# Patient Record
Sex: MALE | Race: WHITE | NOT HISPANIC OR LATINO | Employment: OTHER | URBAN - METROPOLITAN AREA
[De-identification: names, ages, dates, MRNs, and addresses within clinical notes are randomized per-mention and may not be internally consistent; named-entity substitution may affect disease eponyms.]

---

## 2017-01-01 ENCOUNTER — OFFICE VISIT (OUTPATIENT)
Dept: CARDIOLOGY | Facility: CLINIC | Age: 82
End: 2017-01-01

## 2017-01-01 ENCOUNTER — CLINICAL SUPPORT NO REQUIREMENTS (OUTPATIENT)
Dept: CARDIOLOGY | Facility: CLINIC | Age: 82
End: 2017-01-01

## 2017-01-01 VITALS
SYSTOLIC BLOOD PRESSURE: 122 MMHG | WEIGHT: 165 LBS | DIASTOLIC BLOOD PRESSURE: 82 MMHG | BODY MASS INDEX: 25.01 KG/M2 | HEART RATE: 61 BPM | HEIGHT: 68 IN

## 2017-01-01 DIAGNOSIS — I10 ESSENTIAL HYPERTENSION: ICD-10-CM

## 2017-01-01 DIAGNOSIS — I74.9 ARTERIAL EMBOLISM AND THROMBOSIS (HCC): Primary | ICD-10-CM

## 2017-01-01 DIAGNOSIS — Z95.0 PACEMAKER: ICD-10-CM

## 2017-01-01 DIAGNOSIS — I48.20 CHRONIC ATRIAL FIBRILLATION (HCC): Primary | ICD-10-CM

## 2017-01-01 DIAGNOSIS — E78.5 HYPERLIPIDEMIA, UNSPECIFIED HYPERLIPIDEMIA TYPE: ICD-10-CM

## 2017-01-01 DIAGNOSIS — I48.20 CHRONIC ATRIAL FIBRILLATION (HCC): ICD-10-CM

## 2017-01-01 PROCEDURE — 93279 PRGRMG DEV EVAL PM/LDLS PM: CPT | Performed by: INTERNAL MEDICINE

## 2017-01-01 PROCEDURE — 93000 ELECTROCARDIOGRAM COMPLETE: CPT | Performed by: INTERNAL MEDICINE

## 2017-01-01 PROCEDURE — 99214 OFFICE O/P EST MOD 30 MIN: CPT | Performed by: INTERNAL MEDICINE

## 2017-01-01 RX ORDER — AMLODIPINE BESYLATE 5 MG/1
5 TABLET ORAL DAILY
Qty: 90 TABLET | Refills: 1 | Status: SHIPPED | OUTPATIENT
Start: 2017-01-01

## 2017-01-01 RX ORDER — AMLODIPINE BESYLATE 2.5 MG/1
2.5 TABLET ORAL DAILY
Qty: 90 TABLET | Refills: 1 | Status: SHIPPED | OUTPATIENT
Start: 2017-01-01 | End: 2017-01-01 | Stop reason: SDUPTHER

## 2017-02-14 RX ORDER — AMLODIPINE BESYLATE 5 MG/1
5 TABLET ORAL DAILY
Qty: 90 TABLET | Refills: 1 | Status: SHIPPED | OUTPATIENT
Start: 2017-02-14 | End: 2017-01-01 | Stop reason: SDUPTHER

## 2017-02-14 RX ORDER — WARFARIN SODIUM 4 MG
TABLET ORAL
Qty: 90 TABLET | Refills: 0 | Status: SHIPPED | OUTPATIENT
Start: 2017-02-14 | End: 2017-04-03 | Stop reason: SDUPTHER

## 2017-04-03 RX ORDER — WARFARIN SODIUM 4 MG
TABLET ORAL
Qty: 90 TABLET | Refills: 0 | Status: SHIPPED | OUTPATIENT
Start: 2017-04-03 | End: 2017-07-26 | Stop reason: SDUPTHER

## 2017-07-26 RX ORDER — WARFARIN SODIUM 4 MG
TABLET ORAL
Qty: 90 TABLET | Refills: 1 | Status: SHIPPED | OUTPATIENT
Start: 2017-07-26 | End: 2018-01-01 | Stop reason: SDUPTHER

## 2017-10-30 NOTE — PROGRESS NOTES
Elder Parker  4/11/1923  Date of Office Visit: 10/30/17  Encounter Provider: Dewayne Mccray MD  Place of Service: [facility] CARDIOLOGY    CHIEF COMPLAINT:   1. Followup for permanent atrial fibrillation.   2. Dual chamber pacemaker placement.   3. 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     HISTORY OF PRESENT ILLNESS:   Dr. Pallares,   I had the pleasure of seeing your patient in followup today. As you well know, he is a very pleasant 89-year-old gentleman with a medical history of chronic atrial fibrillation, hypertension, hyperlipidemia who presented to the hospital with a pulseless pallor in his right upper extremity in 2014. During his presentation he was noted to have an INR of 1.4. He was found to have a right upper extremity thrombotic arterial occlusion and underwent open brachial embolectomy which was successful. He was continued on a heparin drip and was subsequently discharged with therapeutic INRs.      Since our last visit, he has been doing very well.  He denies any recent falls.  No bleeding complications.  He is tolerating his current medical regimen well.  He has no real change from our prior visit.        Review of Systems   Constitution: Negative for fever, weakness and malaise/fatigue.   HENT: Negative for nosebleeds and sore throat.    Eyes: Positive for vision loss in left eye and vision loss in right eye. Negative for blurred vision and double vision.   Cardiovascular: Positive for leg swelling. Negative for chest pain, claudication, palpitations and syncope.   Respiratory: Negative for cough, shortness of breath and snoring.    Endocrine: Negative for cold intolerance, heat intolerance and polydipsia.   Skin: Positive for color change. Negative for itching and poor wound healing.   Musculoskeletal: Negative for joint pain, joint swelling, muscle weakness and myalgias.   Gastrointestinal:  "Negative for abdominal pain, melena, nausea and vomiting.   Neurological: Negative for light-headedness, loss of balance, seizures and vertigo.   Psychiatric/Behavioral: Negative for altered mental status and depression.          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 (Patient taking differently: Take 2.5 mg by mouth Daily.) 90 tablet 1   • atorvastatin (LIPITOR) 20 MG tablet Take 1 tablet by mouth daily.     • COUMADIN 4 MG tablet TAKE ONE TABLET BY MOUTH DAILY OR AS DIRECTED. 90 tablet 1   • irbesartan (AVAPRO) 300 MG tablet Take 1 tablet by mouth daily.     • ISOSORBIDE DINITRATE PO Take 90 mg by mouth Daily.     • nitroglycerin (NITROSTAT) 0.4 MG SL tablet Place 0.4 mg under the tongue every 5 (five) minutes as needed for chest pain. Take no more than 3 doses in 15 minutes.       No current facility-administered medications on file prior to visit.        No Known Allergies    Vitals:    10/30/17 1154   BP: 122/82   Pulse: 61   Weight: 165 lb (74.8 kg)   Height: 68\" (172.7 cm)     Physical Exam   Constitutional: He is oriented to person, place, and time. He appears well-developed and well-nourished.   HENT:   Head: Normocephalic and atraumatic.   Eyes: Conjunctivae and EOM are normal. No scleral icterus.   Neck: Normal range of motion. Neck supple. Normal carotid pulses, no hepatojugular reflux and no JVD " present. Carotid bruit is not present. No tracheal deviation present. No thyromegaly present.   Cardiovascular: Normal rate and regular rhythm.  Exam reveals no gallop and no friction rub.    No murmur heard.  Pulses:       Carotid pulses are 2+ on the right side, and 2+ on the left side.       Radial pulses are 2+ on the right side, and 2+ on the left side.        Femoral pulses are 2+ on the right side, and 2+ on the left side.       Dorsalis pedis pulses are 2+ on the right side, and 2+ on the left side.        Posterior tibial pulses are 2+ on the right side, and 2+ on the left side.   Pulmonary/Chest: Breath sounds normal. No respiratory distress. He has no decreased breath sounds. He has no wheezes. He has no rhonchi. He has no rales. He exhibits no tenderness.   Abdominal: Soft. Bowel sounds are normal. He exhibits no distension. There is no tenderness. There is no rebound.   Musculoskeletal: He exhibits edema. He exhibits no deformity. Tenderness: 1+ bilateral LE edema.   Neurological: He is alert and oriented to person, place, and time. He has normal strength. No sensory deficit.   Skin: No rash noted. No erythema.   Pacemaker left chest wall.  Venous stasis changes bilateral LE.        Psychiatric: He has a normal mood and affect. His behavior is normal.       No components found for: CBC  No results found for: CMP  No components found for: LIPID  No results found for: BMP      ECG 12 Lead  Date/Time: 10/30/2017 12:30 PM  Performed by: LUIS MANUEL HOLLINS  Authorized by: LUIS MANUEL HOLLINS   Comparison: compared with previous ECG from 11/28/2017  Similar to previous ECG  Rhythm: atrial fibrillation  Comments: Ventricular paced               DISCUSSION/SUMMARY 89-year-old gentleman with a medical history of chronic atrial fibrillation, dual chamber pacemaker, and previous right upper extremity arterial embolism in acute limb with embolectomy who presents back for followup.  Since our last visit he has  been doing very well.   Denies any new symptoms.  No recent bleeding complications.    1. Chronic atrial fibrillation; prior dual chamber pacemaker implantation.  Continue coumadin therapy lifelong.  He has not had any recent falls or blood loss in his stool.    2. Essential hypertension; blood pressure is well-controlled today.  3.  Hyperlipidemia: Statin therapy monitored by primary    I plan on seeing him back in 1 year.     Atrial Fibrillation and Atrial Flutter  Assessment  • The patient has persistent atrial fibrillation  • This is non-valvular in etiology  • The patient's CHADS2-VASc score is 6  • A [id number] score of 2 or more is considered a high risk for a thromboembolic event  • Warfarin prescribed    Plan  • Continue in atrial fibrillation with rate control  • Continue warfarin for antithrombotic therapy, bleeding issues discussed    Subjective - Objective  • The average duration of atrial fibrillation episodes is >3 months

## 2018-01-01 ENCOUNTER — HOSPITAL ENCOUNTER (OUTPATIENT)
Dept: CARDIOLOGY | Facility: HOSPITAL | Age: 83
Discharge: HOME | End: 2018-05-14
Admitting: NURSE PRACTITIONER

## 2018-01-01 ENCOUNTER — LAB (OUTPATIENT)
Dept: OTHER | Facility: HOSPITAL | Age: 83
End: 2018-01-01

## 2018-01-01 ENCOUNTER — HOSPITAL ENCOUNTER (OUTPATIENT)
Dept: PET IMAGING | Facility: HOSPITAL | Age: 83
Discharge: HOME | End: 2018-06-20
Attending: INTERNAL MEDICINE

## 2018-01-01 ENCOUNTER — APPOINTMENT (OUTPATIENT)
Dept: LAB | Facility: HOSPITAL | Age: 83
End: 2018-01-01

## 2018-01-01 ENCOUNTER — TELEPHONE (OUTPATIENT)
Dept: CARDIOLOGY | Facility: CLINIC | Age: 83
End: 2018-01-01

## 2018-01-01 ENCOUNTER — DOCUMENTATION (OUTPATIENT)
Dept: ONCOLOGY | Facility: CLINIC | Age: 83
End: 2018-01-01

## 2018-01-01 ENCOUNTER — CLINICAL SUPPORT NO REQUIREMENTS (OUTPATIENT)
Dept: CARDIOLOGY | Facility: CLINIC | Age: 83
End: 2018-01-01

## 2018-01-01 ENCOUNTER — APPOINTMENT (OUTPATIENT)
Dept: OTHER | Facility: HOSPITAL | Age: 83
End: 2018-01-01

## 2018-01-01 ENCOUNTER — TRANSCRIBE ORDERS (OUTPATIENT)
Dept: ADMINISTRATIVE | Facility: HOSPITAL | Age: 83
End: 2018-01-01

## 2018-01-01 ENCOUNTER — TELEPHONE (OUTPATIENT)
Dept: ONCOLOGY | Facility: CLINIC | Age: 83
End: 2018-01-01

## 2018-01-01 ENCOUNTER — HOSPITAL ENCOUNTER (OUTPATIENT)
Dept: ULTRASOUND IMAGING | Facility: HOSPITAL | Age: 83
Discharge: HOME | End: 2018-04-30
Attending: INTERNAL MEDICINE

## 2018-01-01 ENCOUNTER — APPOINTMENT (OUTPATIENT)
Dept: CT IMAGING | Facility: HOSPITAL | Age: 83
End: 2018-01-01

## 2018-01-01 ENCOUNTER — OFFICE VISIT (OUTPATIENT)
Dept: ONCOLOGY | Facility: CLINIC | Age: 83
End: 2018-01-01

## 2018-01-01 ENCOUNTER — APPOINTMENT (OUTPATIENT)
Dept: GENERAL RADIOLOGY | Facility: HOSPITAL | Age: 83
End: 2018-01-01

## 2018-01-01 ENCOUNTER — HOSPITAL ENCOUNTER (OUTPATIENT)
Dept: CARDIOLOGY | Facility: HOSPITAL | Age: 83
Discharge: HOME | End: 2018-04-30
Admitting: INTERNAL MEDICINE

## 2018-01-01 ENCOUNTER — TELEPHONE (OUTPATIENT)
Dept: CARDIOLOGY | Facility: HOSPITAL | Age: 83
End: 2018-01-01

## 2018-01-01 ENCOUNTER — TELEPHONE (OUTPATIENT)
Dept: INTERVENTIONAL RADIOLOGY/VASCULAR | Facility: HOSPITAL | Age: 83
End: 2018-01-01

## 2018-01-01 ENCOUNTER — OFFICE VISIT (OUTPATIENT)
Dept: CARDIOLOGY | Facility: CLINIC | Age: 83
End: 2018-01-01

## 2018-01-01 ENCOUNTER — CONSULT (OUTPATIENT)
Dept: ONCOLOGY | Facility: CLINIC | Age: 83
End: 2018-01-01

## 2018-01-01 ENCOUNTER — APPOINTMENT (OUTPATIENT)
Dept: ONCOLOGY | Facility: CLINIC | Age: 83
End: 2018-01-01

## 2018-01-01 ENCOUNTER — HOSPITAL ENCOUNTER (EMERGENCY)
Facility: HOSPITAL | Age: 83
Discharge: HOME | End: 2018-05-19
Attending: EMERGENCY MEDICINE | Admitting: EMERGENCY MEDICINE

## 2018-01-01 ENCOUNTER — HOSPITAL ENCOUNTER (OUTPATIENT)
Dept: PET IMAGING | Facility: HOSPITAL | Age: 83
Discharge: HOME | End: 2018-06-20
Attending: INTERNAL MEDICINE | Admitting: INTERNAL MEDICINE

## 2018-01-01 ENCOUNTER — HOSPITAL ENCOUNTER (OUTPATIENT)
Dept: CT IMAGING | Facility: HOSPITAL | Age: 83
Discharge: HOME | End: 2018-06-06
Attending: INTERNAL MEDICINE | Admitting: INTERNAL MEDICINE

## 2018-01-01 ENCOUNTER — TRANSCRIBE ORDERS (OUTPATIENT)
Dept: CARDIOLOGY | Facility: CLINIC | Age: 83
End: 2018-01-01

## 2018-01-01 ENCOUNTER — TELEPHONE (OUTPATIENT)
Dept: ONCOLOGY | Facility: HOSPITAL | Age: 83
End: 2018-01-01

## 2018-01-01 VITALS
RESPIRATION RATE: 17 BRPM | WEIGHT: 170 LBS | OXYGEN SATURATION: 93 % | HEART RATE: 76 BPM | BODY MASS INDEX: 25.18 KG/M2 | HEIGHT: 69 IN | SYSTOLIC BLOOD PRESSURE: 125 MMHG | DIASTOLIC BLOOD PRESSURE: 67 MMHG

## 2018-01-01 VITALS
HEIGHT: 69 IN | SYSTOLIC BLOOD PRESSURE: 160 MMHG | DIASTOLIC BLOOD PRESSURE: 91 MMHG | RESPIRATION RATE: 14 BRPM | TEMPERATURE: 97.4 F | OXYGEN SATURATION: 94 % | WEIGHT: 160 LBS | BODY MASS INDEX: 23.7 KG/M2 | HEART RATE: 61 BPM

## 2018-01-01 VITALS
DIASTOLIC BLOOD PRESSURE: 65 MMHG | HEIGHT: 68 IN | SYSTOLIC BLOOD PRESSURE: 128 MMHG | RESPIRATION RATE: 16 BRPM | HEART RATE: 80 BPM | BODY MASS INDEX: 25.39 KG/M2 | WEIGHT: 167.5 LBS | OXYGEN SATURATION: 93 % | TEMPERATURE: 98.9 F

## 2018-01-01 VITALS
OXYGEN SATURATION: 98 % | WEIGHT: 160 LBS | TEMPERATURE: 98 F | SYSTOLIC BLOOD PRESSURE: 139 MMHG | HEART RATE: 59 BPM | DIASTOLIC BLOOD PRESSURE: 65 MMHG | HEIGHT: 68 IN | BODY MASS INDEX: 24.25 KG/M2 | RESPIRATION RATE: 16 BRPM

## 2018-01-01 VITALS
WEIGHT: 167.4 LBS | SYSTOLIC BLOOD PRESSURE: 123 MMHG | HEIGHT: 68 IN | RESPIRATION RATE: 14 BRPM | OXYGEN SATURATION: 95 % | TEMPERATURE: 98.5 F | DIASTOLIC BLOOD PRESSURE: 63 MMHG | BODY MASS INDEX: 25.37 KG/M2 | HEART RATE: 62 BPM

## 2018-01-01 VITALS
HEART RATE: 74 BPM | OXYGEN SATURATION: 91 % | DIASTOLIC BLOOD PRESSURE: 70 MMHG | WEIGHT: 167.1 LBS | SYSTOLIC BLOOD PRESSURE: 149 MMHG | BODY MASS INDEX: 25.33 KG/M2 | TEMPERATURE: 98.4 F | RESPIRATION RATE: 16 BRPM | HEIGHT: 68 IN

## 2018-01-01 VITALS
BODY MASS INDEX: 24.29 KG/M2 | DIASTOLIC BLOOD PRESSURE: 58 MMHG | HEIGHT: 69 IN | SYSTOLIC BLOOD PRESSURE: 110 MMHG | HEART RATE: 69 BPM | WEIGHT: 164 LBS

## 2018-01-01 DIAGNOSIS — I74.9 ARTERIAL EMBOLISM AND THROMBOSIS (HCC): ICD-10-CM

## 2018-01-01 DIAGNOSIS — I48.20 CHRONIC ATRIAL FIBRILLATION (HCC): Primary | ICD-10-CM

## 2018-01-01 DIAGNOSIS — I48.20 CHRONIC ATRIAL FIBRILLATION (HCC): ICD-10-CM

## 2018-01-01 DIAGNOSIS — M25.511 ACUTE PAIN OF RIGHT SHOULDER: ICD-10-CM

## 2018-01-01 DIAGNOSIS — C34.91 NON-SMALL CELL CANCER OF RIGHT LUNG (HCC): Primary | ICD-10-CM

## 2018-01-01 DIAGNOSIS — R91.8 MASS OF UPPER LOBE OF RIGHT LUNG: ICD-10-CM

## 2018-01-01 DIAGNOSIS — R60.0 LEG EDEMA, LEFT: ICD-10-CM

## 2018-01-01 DIAGNOSIS — R01.1 HEART MURMUR: Primary | ICD-10-CM

## 2018-01-01 DIAGNOSIS — I74.9 ARTERIAL EMBOLISM AND THROMBOSIS (HCC): Primary | ICD-10-CM

## 2018-01-01 DIAGNOSIS — E78.5 HYPERLIPIDEMIA, UNSPECIFIED HYPERLIPIDEMIA TYPE: ICD-10-CM

## 2018-01-01 DIAGNOSIS — I50.33 ACUTE ON CHRONIC DIASTOLIC CONGESTIVE HEART FAILURE (HCC): ICD-10-CM

## 2018-01-01 DIAGNOSIS — C34.91 NON-SMALL CELL CANCER OF RIGHT LUNG (HCC): ICD-10-CM

## 2018-01-01 DIAGNOSIS — Z79.01 ANTICOAGULATED ON COUMADIN: ICD-10-CM

## 2018-01-01 DIAGNOSIS — I10 ESSENTIAL HYPERTENSION: ICD-10-CM

## 2018-01-01 DIAGNOSIS — R91.8 MASS OF UPPER LOBE OF RIGHT LUNG: Primary | ICD-10-CM

## 2018-01-01 DIAGNOSIS — R01.1 HEART MURMUR: ICD-10-CM

## 2018-01-01 DIAGNOSIS — N17.9 AKI (ACUTE KIDNEY INJURY) (HCC): Primary | ICD-10-CM

## 2018-01-01 DIAGNOSIS — R06.02 SHORTNESS OF BREATH: Primary | ICD-10-CM

## 2018-01-01 DIAGNOSIS — I50.32 CHRONIC DIASTOLIC CONGESTIVE HEART FAILURE (HCC): ICD-10-CM

## 2018-01-01 DIAGNOSIS — R63.5 WEIGHT GAIN: Primary | ICD-10-CM

## 2018-01-01 LAB
ALBUMIN SERPL-MCNC: 2.8 G/DL (ref 3.5–5.2)
ALBUMIN SERPL-MCNC: 3.3 G/DL (ref 3.5–5.2)
ALBUMIN/GLOB SERPL: 1 G/DL
ALBUMIN/GLOB SERPL: 1.1 G/DL
ALP SERPL-CCNC: 104 U/L (ref 39–117)
ALP SERPL-CCNC: 99 U/L (ref 39–117)
ALT SERPL W P-5'-P-CCNC: 10 U/L (ref 1–41)
ALT SERPL W P-5'-P-CCNC: 9 U/L (ref 1–41)
ANION GAP SERPL CALCULATED.3IONS-SCNC: 11.1 MMOL/L
ANION GAP SERPL CALCULATED.3IONS-SCNC: 13.1 MMOL/L
ANION GAP SERPL CALCULATED.3IONS-SCNC: 9.3 MMOL/L
ASCENDING AORTA: 3.3 CM
AST SERPL-CCNC: 11 U/L (ref 1–40)
AST SERPL-CCNC: 8 U/L (ref 1–40)
BASOPHILS # BLD AUTO: 0.03 10*3/MM3 (ref 0–0.2)
BASOPHILS # BLD AUTO: 0.04 10*3/MM3 (ref 0–0.2)
BASOPHILS # BLD AUTO: 0.04 10*3/MM3 (ref 0–0.2)
BASOPHILS # BLD AUTO: 0.06 10*3/MM3 (ref 0–0.2)
BASOPHILS # BLD AUTO: 0.06 10*3/MM3 (ref 0–0.2)
BASOPHILS NFR BLD AUTO: 0.6 % (ref 0–1.5)
BASOPHILS NFR BLD AUTO: 0.6 % (ref 0–1.5)
BASOPHILS NFR BLD AUTO: 0.7 % (ref 0–1.5)
BASOPHILS NFR BLD AUTO: 0.9 % (ref 0–1.5)
BASOPHILS NFR BLD AUTO: 1.1 % (ref 0–1.5)
BH CV ECHO MEAS - ACS: 2.2 CM
BH CV ECHO MEAS - AO MAX PG (FULL): 5.3 MMHG
BH CV ECHO MEAS - AO MAX PG: 8.8 MMHG
BH CV ECHO MEAS - AO MEAN PG (FULL): 2.8 MMHG
BH CV ECHO MEAS - AO MEAN PG: 4.7 MMHG
BH CV ECHO MEAS - AO ROOT AREA (BSA CORRECTED): 1.9
BH CV ECHO MEAS - AO ROOT AREA: 10.7 CM^2
BH CV ECHO MEAS - AO ROOT DIAM: 3.7 CM
BH CV ECHO MEAS - AO V2 MAX: 148.7 CM/SEC
BH CV ECHO MEAS - AO V2 MEAN: 99.7 CM/SEC
BH CV ECHO MEAS - AO V2 VTI: 27.7 CM
BH CV ECHO MEAS - AVA(I,A): 2.1 CM^2
BH CV ECHO MEAS - AVA(I,D): 2.1 CM^2
BH CV ECHO MEAS - AVA(V,A): 2.3 CM^2
BH CV ECHO MEAS - AVA(V,D): 2.3 CM^2
BH CV ECHO MEAS - BSA(HAYCOCK): 1.9 M^2
BH CV ECHO MEAS - BSA: 1.9 M^2
BH CV ECHO MEAS - BZI_BMI: 25.1 KILOGRAMS/M^2
BH CV ECHO MEAS - BZI_METRIC_HEIGHT: 175.3 CM
BH CV ECHO MEAS - BZI_METRIC_WEIGHT: 77.1 KG
BH CV ECHO MEAS - CONTRAST EF (2CH): 64.2 ML/M^2
BH CV ECHO MEAS - CONTRAST EF 4CH: 56.1 ML/M^2
BH CV ECHO MEAS - EDV(MOD-SP2): 67 ML
BH CV ECHO MEAS - EDV(MOD-SP4): 66 ML
BH CV ECHO MEAS - EDV(TEICH): 110.9 ML
BH CV ECHO MEAS - EF(CUBED): 76.6 %
BH CV ECHO MEAS - EF(MOD-BP): 59 %
BH CV ECHO MEAS - EF(MOD-SP2): 64.2 %
BH CV ECHO MEAS - EF(MOD-SP4): 56.1 %
BH CV ECHO MEAS - EF(TEICH): 68.5 %
BH CV ECHO MEAS - ESV(MOD-SP2): 24 ML
BH CV ECHO MEAS - ESV(MOD-SP4): 29 ML
BH CV ECHO MEAS - ESV(TEICH): 34.9 ML
BH CV ECHO MEAS - FS: 38.4 %
BH CV ECHO MEAS - IVS/LVPW: 1
BH CV ECHO MEAS - IVSD: 1.1 CM
BH CV ECHO MEAS - LAT PEAK E' VEL: 7 CM/SEC
BH CV ECHO MEAS - LV DIASTOLIC VOL/BSA (35-75): 34.2 ML/M^2
BH CV ECHO MEAS - LV MASS(C)D: 194.7 GRAMS
BH CV ECHO MEAS - LV MASS(C)DI: 101 GRAMS/M^2
BH CV ECHO MEAS - LV MAX PG: 3.6 MMHG
BH CV ECHO MEAS - LV MEAN PG: 1.9 MMHG
BH CV ECHO MEAS - LV SYSTOLIC VOL/BSA (12-30): 15 ML/M^2
BH CV ECHO MEAS - LV V1 MAX: 94.3 CM/SEC
BH CV ECHO MEAS - LV V1 MEAN: 64.2 CM/SEC
BH CV ECHO MEAS - LV V1 VTI: 16.3 CM
BH CV ECHO MEAS - LVIDD: 4.9 CM
BH CV ECHO MEAS - LVIDS: 3 CM
BH CV ECHO MEAS - LVLD AP2: 6.2 CM
BH CV ECHO MEAS - LVLD AP4: 6.8 CM
BH CV ECHO MEAS - LVLS AP2: 5.3 CM
BH CV ECHO MEAS - LVLS AP4: 6.1 CM
BH CV ECHO MEAS - LVOT AREA (M): 3.5 CM^2
BH CV ECHO MEAS - LVOT AREA: 3.6 CM^2
BH CV ECHO MEAS - LVOT DIAM: 2.1 CM
BH CV ECHO MEAS - LVPWD: 1.1 CM
BH CV ECHO MEAS - MED PEAK E' VEL: 9 CM/SEC
BH CV ECHO MEAS - MR MAX PG: 28.1 MMHG
BH CV ECHO MEAS - MR MAX VEL: 265 CM/SEC
BH CV ECHO MEAS - MV A DUR: 0.11 SEC
BH CV ECHO MEAS - MV A MAX VEL: 55.7 CM/SEC
BH CV ECHO MEAS - MV DEC SLOPE: 645.9 CM/SEC^2
BH CV ECHO MEAS - MV DEC TIME: 0.15 SEC
BH CV ECHO MEAS - MV E MAX VEL: 101.4 CM/SEC
BH CV ECHO MEAS - MV E/A: 1.8
BH CV ECHO MEAS - MV MAX PG: 3.5 MMHG
BH CV ECHO MEAS - MV MEAN PG: 1 MMHG
BH CV ECHO MEAS - MV P1/2T MAX VEL: 102.6 CM/SEC
BH CV ECHO MEAS - MV P1/2T: 46.5 MSEC
BH CV ECHO MEAS - MV V2 MAX: 92.9 CM/SEC
BH CV ECHO MEAS - MV V2 MEAN: 44.4 CM/SEC
BH CV ECHO MEAS - MV V2 VTI: 22.4 CM
BH CV ECHO MEAS - MVA P1/2T LCG: 2.1 CM^2
BH CV ECHO MEAS - MVA(P1/2T): 4.7 CM^2
BH CV ECHO MEAS - MVA(VTI): 2.6 CM^2
BH CV ECHO MEAS - PA ACC TIME: 0.08 SEC
BH CV ECHO MEAS - PA MAX PG (FULL): 0.82 MMHG
BH CV ECHO MEAS - PA MAX PG: 2.6 MMHG
BH CV ECHO MEAS - PA PR(ACCEL): 41 MMHG
BH CV ECHO MEAS - PA V2 MAX: 80.1 CM/SEC
BH CV ECHO MEAS - PULM DIAS VEL: 19.9 CM/SEC
BH CV ECHO MEAS - PULM S/D: 1.9
BH CV ECHO MEAS - PULM SYS VEL: 37.2 CM/SEC
BH CV ECHO MEAS - RAP SYSTOLE: 15 MMHG
BH CV ECHO MEAS - RV MAX PG: 1.7 MMHG
BH CV ECHO MEAS - RV MEAN PG: 0.66 MMHG
BH CV ECHO MEAS - RV V1 MAX: 66 CM/SEC
BH CV ECHO MEAS - RV V1 MEAN: 35.3 CM/SEC
BH CV ECHO MEAS - RV V1 VTI: 8.3 CM
BH CV ECHO MEAS - RVSP: 51 MMHG
BH CV ECHO MEAS - SI(AO): 154.1 ML/M^2
BH CV ECHO MEAS - SI(CUBED): 45.8 ML/M^2
BH CV ECHO MEAS - SI(LVOT): 30.1 ML/M^2
BH CV ECHO MEAS - SI(MOD-SP2): 22.3 ML/M^2
BH CV ECHO MEAS - SI(MOD-SP4): 19.2 ML/M^2
BH CV ECHO MEAS - SI(TEICH): 39.4 ML/M^2
BH CV ECHO MEAS - SUP REN AO DIAM: 2.1 CM
BH CV ECHO MEAS - SV(AO): 297 ML
BH CV ECHO MEAS - SV(CUBED): 88.2 ML
BH CV ECHO MEAS - SV(LVOT): 58 ML
BH CV ECHO MEAS - SV(MOD-SP2): 43 ML
BH CV ECHO MEAS - SV(MOD-SP4): 37 ML
BH CV ECHO MEAS - SV(TEICH): 76 ML
BH CV ECHO MEAS - TAPSE (>1.6): 1.9 CM2
BH CV ECHO MEAS - TR MAX VEL: 300.7 CM/SEC
BH CV ECHO MEASUREMENTS AVERAGE E/E' RATIO: 12.68
BH CV XLRA - RV BASE: 4.8 CM
BH CV XLRA - TDI S': 16 CM/SEC
BILIRUB SERPL-MCNC: 0.7 MG/DL (ref 0.1–1.2)
BILIRUB SERPL-MCNC: 0.7 MG/DL (ref 0.1–1.2)
BUN BLD-MCNC: 27 MG/DL (ref 8–23)
BUN BLD-MCNC: 33 MG/DL (ref 8–23)
BUN BLD-MCNC: 47 MG/DL (ref 8–23)
BUN/CREAT SERPL: 30.3 (ref 7–25)
BUN/CREAT SERPL: 31.8 (ref 7–25)
BUN/CREAT SERPL: 40.9 (ref 7–25)
CALCIUM SPEC-SCNC: 8.2 MG/DL (ref 8.2–9.6)
CALCIUM SPEC-SCNC: 8.6 MG/DL (ref 8.2–9.6)
CALCIUM SPEC-SCNC: 8.9 MG/DL (ref 8.2–9.6)
CHLORIDE SERPL-SCNC: 101 MMOL/L (ref 98–107)
CHLORIDE SERPL-SCNC: 102 MMOL/L (ref 98–107)
CHLORIDE SERPL-SCNC: 103 MMOL/L (ref 98–107)
CO2 SERPL-SCNC: 24.9 MMOL/L (ref 22–29)
CO2 SERPL-SCNC: 24.9 MMOL/L (ref 22–29)
CO2 SERPL-SCNC: 25.7 MMOL/L (ref 22–29)
CREAT BLD-MCNC: 0.85 MG/DL (ref 0.76–1.27)
CREAT BLD-MCNC: 1.09 MG/DL (ref 0.76–1.27)
CREAT BLD-MCNC: 1.15 MG/DL (ref 0.76–1.27)
CYTO UR: NORMAL
D DIMER PPP FEU-MCNC: 0.41 MCGFEU/ML (ref 0–0.49)
DEPRECATED RDW RBC AUTO: 59.7 FL (ref 37–54)
DEPRECATED RDW RBC AUTO: 59.8 FL (ref 37–54)
DEPRECATED RDW RBC AUTO: 61.5 FL (ref 37–54)
DEPRECATED RDW RBC AUTO: 62.3 FL (ref 37–54)
DEPRECATED RDW RBC AUTO: 63.6 FL (ref 37–54)
EOSINOPHIL # BLD AUTO: 0.08 10*3/MM3 (ref 0–0.7)
EOSINOPHIL # BLD AUTO: 0.1 10*3/MM3 (ref 0–0.7)
EOSINOPHIL # BLD AUTO: 0.1 10*3/MM3 (ref 0–0.7)
EOSINOPHIL # BLD AUTO: 0.13 10*3/MM3 (ref 0–0.7)
EOSINOPHIL # BLD AUTO: 0.14 10*3/MM3 (ref 0–0.7)
EOSINOPHIL NFR BLD AUTO: 1.3 % (ref 0.3–6.2)
EOSINOPHIL NFR BLD AUTO: 1.6 % (ref 0.3–6.2)
EOSINOPHIL NFR BLD AUTO: 2 % (ref 0.3–6.2)
EOSINOPHIL NFR BLD AUTO: 2.2 % (ref 0.3–6.2)
EOSINOPHIL NFR BLD AUTO: 2.3 % (ref 0.3–6.2)
ERYTHROCYTE [DISTWIDTH] IN BLOOD BY AUTOMATED COUNT: 17.7 % (ref 11.5–14.5)
ERYTHROCYTE [DISTWIDTH] IN BLOOD BY AUTOMATED COUNT: 17.7 % (ref 11.5–14.5)
ERYTHROCYTE [DISTWIDTH] IN BLOOD BY AUTOMATED COUNT: 17.8 % (ref 11.5–14.5)
ERYTHROCYTE [DISTWIDTH] IN BLOOD BY AUTOMATED COUNT: 19 % (ref 11.5–14.5)
ERYTHROCYTE [DISTWIDTH] IN BLOOD BY AUTOMATED COUNT: 19.8 % (ref 11.5–14.5)
FERRITIN SERPL-MCNC: 379.4 NG/ML (ref 30–400)
GFR SERPL CREATININE-BSD FRML MDRD: 59 ML/MIN/1.73
GFR SERPL CREATININE-BSD FRML MDRD: 63 ML/MIN/1.73
GFR SERPL CREATININE-BSD FRML MDRD: 84 ML/MIN/1.73
GLOBULIN UR ELPH-MCNC: 2.8 GM/DL
GLOBULIN UR ELPH-MCNC: 2.9 GM/DL
GLUCOSE BLD-MCNC: 93 MG/DL (ref 65–99)
GLUCOSE BLD-MCNC: 95 MG/DL (ref 65–99)
GLUCOSE BLD-MCNC: 99 MG/DL (ref 65–99)
GLUCOSE BLDC GLUCOMTR-MCNC: 98 MG/DL (ref 70–130)
HCT VFR BLD AUTO: 32.5 % (ref 40.4–52.2)
HCT VFR BLD AUTO: 33 % (ref 40.4–52.2)
HCT VFR BLD AUTO: 33.5 % (ref 40.4–52.2)
HCT VFR BLD AUTO: 34.1 % (ref 40.4–52.2)
HCT VFR BLD AUTO: 35.1 % (ref 40.4–52.2)
HGB BLD-MCNC: 10.5 G/DL (ref 13.7–17.6)
HGB BLD-MCNC: 10.6 G/DL (ref 13.7–17.6)
HGB BLD-MCNC: 10.7 G/DL (ref 13.7–17.6)
HGB BLD-MCNC: 11.1 G/DL (ref 13.7–17.6)
HGB BLD-MCNC: 11.2 G/DL (ref 13.7–17.6)
IMM GRANULOCYTES # BLD: 0.01 10*3/MM3 (ref 0–0.03)
IMM GRANULOCYTES # BLD: 0.01 10*3/MM3 (ref 0–0.03)
IMM GRANULOCYTES # BLD: 0.02 10*3/MM3 (ref 0–0.03)
IMM GRANULOCYTES # BLD: 0.02 10*3/MM3 (ref 0–0.03)
IMM GRANULOCYTES # BLD: 0.03 10*3/MM3 (ref 0–0.03)
IMM GRANULOCYTES NFR BLD: 0.2 % (ref 0–0.5)
IMM GRANULOCYTES NFR BLD: 0.2 % (ref 0–0.5)
IMM GRANULOCYTES NFR BLD: 0.3 % (ref 0–0.5)
IMM GRANULOCYTES NFR BLD: 0.4 % (ref 0–0.5)
IMM GRANULOCYTES NFR BLD: 0.5 % (ref 0–0.5)
INR PPP: 1.3 (ref 0.8–1.2)
INR PPP: 2.32 (ref 0.9–1.1)
INR PPP: 2.4
IRON 24H UR-MRATE: 20 MCG/DL (ref 59–158)
IRON SATN MFR SERPL: 7 % (ref 20–50)
LAB AP CASE REPORT: NORMAL
LAB AP CLINICAL INFORMATION: NORMAL
LEFT ATRIUM VOLUME INDEX: 108 ML/M2
LYMPHOCYTES # BLD AUTO: 1.23 10*3/MM3 (ref 0.9–4.8)
LYMPHOCYTES # BLD AUTO: 1.47 10*3/MM3 (ref 0.9–4.8)
LYMPHOCYTES # BLD AUTO: 1.62 10*3/MM3 (ref 0.9–4.8)
LYMPHOCYTES # BLD AUTO: 1.7 10*3/MM3 (ref 0.9–4.8)
LYMPHOCYTES # BLD AUTO: 1.9 10*3/MM3 (ref 0.9–4.8)
LYMPHOCYTES NFR BLD AUTO: 24.2 % (ref 19.6–45.3)
LYMPHOCYTES NFR BLD AUTO: 25.2 % (ref 19.6–45.3)
LYMPHOCYTES NFR BLD AUTO: 25.9 % (ref 19.6–45.3)
LYMPHOCYTES NFR BLD AUTO: 28.1 % (ref 19.6–45.3)
LYMPHOCYTES NFR BLD AUTO: 29.2 % (ref 19.6–45.3)
Lab: NORMAL
MCH RBC QN AUTO: 29.1 PG (ref 27–32.7)
MCH RBC QN AUTO: 29.5 PG (ref 27–32.7)
MCH RBC QN AUTO: 29.6 PG (ref 27–32.7)
MCH RBC QN AUTO: 29.7 PG (ref 27–32.7)
MCH RBC QN AUTO: 29.8 PG (ref 27–32.7)
MCHC RBC AUTO-ENTMCNC: 31.3 G/DL (ref 32.6–36.4)
MCHC RBC AUTO-ENTMCNC: 31.9 G/DL (ref 32.6–36.4)
MCHC RBC AUTO-ENTMCNC: 32.1 G/DL (ref 32.6–36.4)
MCHC RBC AUTO-ENTMCNC: 32.6 G/DL (ref 32.6–36.4)
MCHC RBC AUTO-ENTMCNC: 32.9 G/DL (ref 32.6–36.4)
MCV RBC AUTO: 89.3 FL (ref 79.8–96.2)
MCV RBC AUTO: 89.5 FL (ref 79.8–96.2)
MCV RBC AUTO: 92.2 FL (ref 79.8–96.2)
MCV RBC AUTO: 93.4 FL (ref 79.8–96.2)
MCV RBC AUTO: 94.9 FL (ref 79.8–96.2)
MONOCYTES # BLD AUTO: 0.78 10*3/MM3 (ref 0.2–1.2)
MONOCYTES # BLD AUTO: 0.79 10*3/MM3 (ref 0.2–1.2)
MONOCYTES # BLD AUTO: 0.87 10*3/MM3 (ref 0.2–1.2)
MONOCYTES # BLD AUTO: 0.89 10*3/MM3 (ref 0.2–1.2)
MONOCYTES # BLD AUTO: 1.07 10*3/MM3 (ref 0.2–1.2)
MONOCYTES NFR BLD AUTO: 13.4 % (ref 5–12)
MONOCYTES NFR BLD AUTO: 13.9 % (ref 5–12)
MONOCYTES NFR BLD AUTO: 14.7 % (ref 5–12)
MONOCYTES NFR BLD AUTO: 15.3 % (ref 5–12)
MONOCYTES NFR BLD AUTO: 16.6 % (ref 5–12)
NEUTROPHILS # BLD AUTO: 2.93 10*3/MM3 (ref 1.9–8.1)
NEUTROPHILS # BLD AUTO: 3.2 10*3/MM3 (ref 1.9–8.1)
NEUTROPHILS # BLD AUTO: 3.35 10*3/MM3 (ref 1.9–8.1)
NEUTROPHILS # BLD AUTO: 3.52 10*3/MM3 (ref 1.9–8.1)
NEUTROPHILS # BLD AUTO: 3.61 10*3/MM3 (ref 1.9–8.1)
NEUTROPHILS NFR BLD AUTO: 54 % (ref 42.7–76)
NEUTROPHILS NFR BLD AUTO: 55.2 % (ref 42.7–76)
NEUTROPHILS NFR BLD AUTO: 56 % (ref 42.7–76)
NEUTROPHILS NFR BLD AUTO: 56.3 % (ref 42.7–76)
NEUTROPHILS NFR BLD AUTO: 57.5 % (ref 42.7–76)
NRBC BLD MANUAL-RTO: 0 /100 WBC (ref 0–0)
NRBC BLD MANUAL-RTO: 0.4 /100 WBC (ref 0–0)
NT-PROBNP SERPL-MCNC: 3238 PG/ML (ref 0–1800)
NT-PROBNP SERPL-MCNC: 3976 PG/ML (ref 0–1800)
PATH REPORT.ADDENDUM SPEC: NORMAL
PATH REPORT.FINAL DX SPEC: NORMAL
PATH REPORT.GROSS SPEC: NORMAL
PLATELET # BLD AUTO: 234 10*3/MM3 (ref 140–500)
PLATELET # BLD AUTO: 242 10*3/MM3 (ref 140–500)
PLATELET # BLD AUTO: 242 10*3/MM3 (ref 140–500)
PLATELET # BLD AUTO: 248 10*3/MM3 (ref 140–500)
PLATELET # BLD AUTO: 273 10*3/MM3 (ref 140–500)
PMV BLD AUTO: 9.1 FL (ref 6–12)
PMV BLD AUTO: 9.3 FL (ref 6–12)
PMV BLD AUTO: 9.3 FL (ref 6–12)
PMV BLD AUTO: 9.4 FL (ref 6–12)
PMV BLD AUTO: 9.5 FL (ref 6–12)
POTASSIUM BLD-SCNC: 4.2 MMOL/L (ref 3.5–5.2)
POTASSIUM BLD-SCNC: 4.3 MMOL/L (ref 3.5–5.2)
POTASSIUM BLD-SCNC: 5.2 MMOL/L (ref 3.5–5.2)
PROT SERPL-MCNC: 5.6 G/DL (ref 6–8.5)
PROT SERPL-MCNC: 6.2 G/DL (ref 6–8.5)
PROTHROMBIN TIME: 15.3 SECONDS (ref 12.8–15.2)
PROTHROMBIN TIME: 25.1 SECONDS (ref 11.7–14.2)
PROTHROMBIN TIME: 28.5 SECONDS (ref 11–15)
RBC # BLD AUTO: 3.53 10*6/MM3 (ref 4.6–6)
RBC # BLD AUTO: 3.58 10*6/MM3 (ref 4.6–6)
RBC # BLD AUTO: 3.63 10*6/MM3 (ref 4.6–6)
RBC # BLD AUTO: 3.76 10*6/MM3 (ref 4.6–6)
RBC # BLD AUTO: 3.82 10*6/MM3 (ref 4.6–6)
SINUS: 3.6 CM
SODIUM BLD-SCNC: 137 MMOL/L (ref 136–145)
SODIUM BLD-SCNC: 139 MMOL/L (ref 136–145)
SODIUM BLD-SCNC: 139 MMOL/L (ref 136–145)
STJ: 3.1 CM
TIBC SERPL-MCNC: 276 MCG/DL (ref 298–536)
TRANSFERRIN SERPL-MCNC: 185 MG/DL (ref 200–360)
TROPONIN T SERPL-MCNC: 0.01 NG/ML (ref 0–0.03)
TROPONIN T SERPL-MCNC: <0.01 NG/ML (ref 0–0.03)
VIT B12 BLD-MCNC: 610 PG/ML (ref 211–946)
WBC NRBC COR # BLD: 5.09 10*3/MM3 (ref 4.5–10.7)
WBC NRBC COR # BLD: 5.68 10*3/MM3 (ref 4.5–10.7)
WBC NRBC COR # BLD: 6.06 10*3/MM3 (ref 4.5–10.7)
WBC NRBC COR # BLD: 6.44 10*3/MM3 (ref 4.5–10.7)
WBC NRBC COR # BLD: 6.51 10*3/MM3 (ref 4.5–10.7)

## 2018-01-01 PROCEDURE — 84484 ASSAY OF TROPONIN QUANT: CPT | Performed by: EMERGENCY MEDICINE

## 2018-01-01 PROCEDURE — 85025 COMPLETE CBC W/AUTO DIFF WBC: CPT | Performed by: INTERNAL MEDICINE

## 2018-01-01 PROCEDURE — 73030 X-RAY EXAM OF SHOULDER: CPT

## 2018-01-01 PROCEDURE — 85610 PROTHROMBIN TIME: CPT | Performed by: EMERGENCY MEDICINE

## 2018-01-01 PROCEDURE — 88342 IMHCHEM/IMCYTCHM 1ST ANTB: CPT | Performed by: INTERNAL MEDICINE

## 2018-01-01 PROCEDURE — 93010 ELECTROCARDIOGRAM REPORT: CPT | Performed by: INTERNAL MEDICINE

## 2018-01-01 PROCEDURE — 93288 INTERROG EVL PM/LDLS PM IP: CPT | Performed by: INTERNAL MEDICINE

## 2018-01-01 PROCEDURE — 36415 COLL VENOUS BLD VENIPUNCTURE: CPT

## 2018-01-01 PROCEDURE — 82607 VITAMIN B-12: CPT | Performed by: INTERNAL MEDICINE

## 2018-01-01 PROCEDURE — 25010000002 IOPAMIDOL 61 % SOLUTION: Performed by: EMERGENCY MEDICINE

## 2018-01-01 PROCEDURE — 0 FLUDEOXYGLUCOSE F18 SOLUTION: Performed by: INTERNAL MEDICINE

## 2018-01-01 PROCEDURE — 99214 OFFICE O/P EST MOD 30 MIN: CPT | Performed by: INTERNAL MEDICINE

## 2018-01-01 PROCEDURE — 93294 REM INTERROG EVL PM/LDLS PM: CPT | Performed by: INTERNAL MEDICINE

## 2018-01-01 PROCEDURE — 88341 IMHCHEM/IMCYTCHM EA ADD ANTB: CPT | Performed by: INTERNAL MEDICINE

## 2018-01-01 PROCEDURE — 80048 BASIC METABOLIC PNL TOTAL CA: CPT | Performed by: INTERNAL MEDICINE

## 2018-01-01 PROCEDURE — 88305 TISSUE EXAM BY PATHOLOGIST: CPT | Performed by: INTERNAL MEDICINE

## 2018-01-01 PROCEDURE — 77012 CT SCAN FOR NEEDLE BIOPSY: CPT

## 2018-01-01 PROCEDURE — 76857 US EXAM PELVIC LIMITED: CPT

## 2018-01-01 PROCEDURE — 82962 GLUCOSE BLOOD TEST: CPT

## 2018-01-01 PROCEDURE — 83880 ASSAY OF NATRIURETIC PEPTIDE: CPT | Performed by: INTERNAL MEDICINE

## 2018-01-01 PROCEDURE — 93306 TTE W/DOPPLER COMPLETE: CPT | Performed by: INTERNAL MEDICINE

## 2018-01-01 PROCEDURE — 36415 COLL VENOUS BLD VENIPUNCTURE: CPT | Performed by: INTERNAL MEDICINE

## 2018-01-01 PROCEDURE — 93296 REM INTERROG EVL PM/IDS: CPT | Performed by: INTERNAL MEDICINE

## 2018-01-01 PROCEDURE — 80053 COMPREHEN METABOLIC PANEL: CPT | Performed by: INTERNAL MEDICINE

## 2018-01-01 PROCEDURE — 99205 OFFICE O/P NEW HI 60 MIN: CPT | Performed by: INTERNAL MEDICINE

## 2018-01-01 PROCEDURE — 71260 CT THORAX DX C+: CPT

## 2018-01-01 PROCEDURE — A9552 F18 FDG: HCPCS | Performed by: INTERNAL MEDICINE

## 2018-01-01 PROCEDURE — 85025 COMPLETE CBC W/AUTO DIFF WBC: CPT | Performed by: EMERGENCY MEDICINE

## 2018-01-01 PROCEDURE — 82728 ASSAY OF FERRITIN: CPT | Performed by: INTERNAL MEDICINE

## 2018-01-01 PROCEDURE — 83880 ASSAY OF NATRIURETIC PEPTIDE: CPT | Performed by: NURSE PRACTITIONER

## 2018-01-01 PROCEDURE — 94760 N-INVAS EAR/PLS OXIMETRY 1: CPT

## 2018-01-01 PROCEDURE — 83540 ASSAY OF IRON: CPT | Performed by: INTERNAL MEDICINE

## 2018-01-01 PROCEDURE — 80053 COMPREHEN METABOLIC PANEL: CPT | Performed by: EMERGENCY MEDICINE

## 2018-01-01 PROCEDURE — 71046 X-RAY EXAM CHEST 2 VIEWS: CPT

## 2018-01-01 PROCEDURE — 93005 ELECTROCARDIOGRAM TRACING: CPT | Performed by: EMERGENCY MEDICINE

## 2018-01-01 PROCEDURE — 93306 TTE W/DOPPLER COMPLETE: CPT

## 2018-01-01 PROCEDURE — 85379 FIBRIN DEGRADATION QUANT: CPT | Performed by: INTERNAL MEDICINE

## 2018-01-01 PROCEDURE — 85610 PROTHROMBIN TIME: CPT

## 2018-01-01 PROCEDURE — 84484 ASSAY OF TROPONIN QUANT: CPT | Performed by: INTERNAL MEDICINE

## 2018-01-01 PROCEDURE — 99283 EMERGENCY DEPT VISIT LOW MDM: CPT

## 2018-01-01 PROCEDURE — 78815 PET IMAGE W/CT SKULL-THIGH: CPT

## 2018-01-01 PROCEDURE — 84466 ASSAY OF TRANSFERRIN: CPT | Performed by: INTERNAL MEDICINE

## 2018-01-01 RX ORDER — WARFARIN SODIUM 4 MG
TABLET ORAL
Qty: 90 TABLET | Refills: 0 | Status: SHIPPED | OUTPATIENT
Start: 2018-01-01

## 2018-01-01 RX ORDER — ISOSORBIDE MONONITRATE 60 MG/1
90 TABLET, EXTENDED RELEASE ORAL EVERY MORNING
Qty: 135 TABLET | Refills: 3 | Status: SHIPPED | OUTPATIENT
Start: 2018-01-01 | End: 2018-01-01

## 2018-01-01 RX ORDER — BUMETANIDE 1 MG/1
1 TABLET ORAL DAILY
COMMUNITY
End: 2018-01-01

## 2018-01-01 RX ORDER — POTASSIUM CHLORIDE 750 MG/1
10 TABLET, FILM COATED, EXTENDED RELEASE ORAL DAILY
Qty: 30 TABLET | Refills: 11 | Status: SHIPPED | OUTPATIENT
Start: 2018-01-01 | End: 2018-01-01

## 2018-01-01 RX ORDER — NITROGLYCERIN 0.4 MG/1
0.4 TABLET SUBLINGUAL
Status: SHIPPED | OUTPATIENT
Start: 2018-01-01

## 2018-01-01 RX ORDER — SODIUM CHLORIDE 0.9 % (FLUSH) 0.9 %
10 SYRINGE (ML) INJECTION AS NEEDED
Status: DISCONTINUED | OUTPATIENT
Start: 2018-01-01 | End: 2018-01-01 | Stop reason: HOSPADM

## 2018-01-01 RX ORDER — LIDOCAINE HYDROCHLORIDE 10 MG/ML
20 INJECTION, SOLUTION INFILTRATION; PERINEURAL ONCE
Status: COMPLETED | OUTPATIENT
Start: 2018-01-01 | End: 2018-01-01

## 2018-01-01 RX ORDER — HYDROCODONE BITARTRATE AND ACETAMINOPHEN 5; 325 MG/1; MG/1
1 TABLET ORAL EVERY 6 HOURS PRN
Qty: 60 TABLET | Refills: 0 | Status: SHIPPED | OUTPATIENT
Start: 2018-01-01

## 2018-01-01 RX ORDER — HYDROCODONE BITARTRATE AND ACETAMINOPHEN 5; 325 MG/1; MG/1
1 TABLET ORAL EVERY 6 HOURS PRN
Qty: 20 TABLET | Refills: 0 | Status: SHIPPED | OUTPATIENT
Start: 2018-01-01 | End: 2018-01-01 | Stop reason: SDUPTHER

## 2018-01-01 RX ORDER — BUMETANIDE 1 MG/1
1 TABLET ORAL DAILY
Qty: 30 TABLET | Refills: 6 | Status: SHIPPED | OUTPATIENT
Start: 2018-01-01 | End: 2018-01-01

## 2018-01-01 RX ORDER — BENZONATATE 100 MG/1
200 CAPSULE ORAL 3 TIMES DAILY PRN
Qty: 90 CAPSULE | Refills: 2 | Status: SHIPPED | OUTPATIENT
Start: 2018-01-01 | End: 2018-01-01 | Stop reason: ALTCHOICE

## 2018-01-01 RX ORDER — ISOSORBIDE MONONITRATE 60 MG/1
60 TABLET, EXTENDED RELEASE ORAL EVERY MORNING
Qty: 90 TABLET | Refills: 3 | Status: SHIPPED | OUTPATIENT
Start: 2018-01-01 | End: 2018-01-01 | Stop reason: SDUPTHER

## 2018-01-01 RX ORDER — WARFARIN SODIUM 4 MG
TABLET ORAL
Qty: 90 TABLET | Refills: 0 | Status: SHIPPED | OUTPATIENT
Start: 2018-01-01 | End: 2018-01-01 | Stop reason: SDUPTHER

## 2018-01-01 RX ORDER — SODIUM CHLORIDE 0.9 % (FLUSH) 0.9 %
1-10 SYRINGE (ML) INJECTION AS NEEDED
Status: DISCONTINUED | OUTPATIENT
Start: 2018-01-01 | End: 2018-01-01 | Stop reason: HOSPADM

## 2018-01-01 RX ORDER — SODIUM CHLORIDE 0.9 % (FLUSH) 0.9 %
10 SYRINGE (ML) INJECTION AS NEEDED
Status: SHIPPED | OUTPATIENT
Start: 2018-01-01

## 2018-01-01 RX ADMIN — FLUDEOXYGLUCOSE F18 1 DOSE: 300 INJECTION INTRAVENOUS at 09:50

## 2018-01-01 RX ADMIN — LIDOCAINE HYDROCHLORIDE 20 ML: 10 INJECTION, SOLUTION INFILTRATION; PERINEURAL at 09:55

## 2018-01-01 RX ADMIN — IOPAMIDOL 100 ML: 612 INJECTION, SOLUTION INTRAVENOUS at 10:20

## 2018-04-30 NOTE — PROGRESS NOTES
"Bilateral feet swelling for the past 3 weeks and it is getting worse-the swelling has traveled from his feel up towards his abdomen..  Has been wearing compression hose daily and states his legs and feet are very swollen at the end of the day.  States he gets very short of breath with exertion, but no complaints when he is ambulating normally.  Family member states pt has gained 9 lbs in the last 2 weeks.  Pt feels there has been no change his diet to warrant the weight gain.    89 y.o.    175.3 cm (69\") 77.1 kg (170 lb)    Review of patient's allergies indicates no known allergies.    Clara Ann Pallares, MD    No chief complaint on file.      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    • Arterial occlusion     right upper extremity s/p brachial embolectomy 12/2014   • Atrial fibrillation    • DVT (deep venous thrombosis)    • Health care maintenance    • Hyperlipidemia    • Hypertension    • Nephrolithiasis        Past Surgical History:   Procedure Laterality Date   • HERNIA REPAIR     • PACEMAKER IMPLANTATION         Social History     Social History   • Marital status:      Social History Main Topics   • Smoking status: Former Smoker   • Smokeless tobacco: Never Used      Comment: quit 1985   • Alcohol use No      Comment: NO CAFFINE USE   • Drug use: Unknown     Other Topics Concern   • Not on file       Family History   Problem Relation Age of Onset   • No Known Problems Mother    • No Known Problems Father        Vitals:    04/30/18 1130   BP: 125/67   BP Location: Left arm   Patient Position: Sitting   Pulse: 76   Resp: 17   SpO2: 93%   Weight: 77.1 kg (170 lb)   Height: 175.3 cm (69\")         Current Outpatient Prescriptions:   •  amLODIPine (NORVASC) 5 MG tablet, Take 1 tablet by mouth Daily., Disp: 90 tablet, Rfl: 1  •  atorvastatin (LIPITOR) 20 MG tablet, Take 1 tablet by mouth daily., Disp: , Rfl:   •  COUMADIN 4 MG " tablet, TAKE ONE TABLET BY MOUTH DAILY OR AS DIRECTED., Disp: 90 tablet, Rfl: 0  •  irbesartan (AVAPRO) 300 MG tablet, Take 1 tablet by mouth daily., Disp: , Rfl:   •  isosorbide mononitrate (IMDUR) 60 MG 24 hr tablet, Take 1.5 tablets by mouth Every Morning., Disp: 135 tablet, Rfl: 3  •  nitroglycerin (NITROSTAT) 0.4 MG SL tablet, Place 0.4 mg under the tongue every 5 (five) minutes as needed for chest pain. Take no more than 3 doses in 15 minutes., Disp: , Rfl:     Current Facility-Administered Medications:   •  nitroglycerin (NITROSTAT) SL tablet 0.4 mg, 0.4 mg, Sublingual, Q5 Min PRN, Leon Tanner MD  •  sodium chloride 0.9 % flush 10 mL, 10 mL, Intravenous, PRN, Leon Tanner MD

## 2018-04-30 NOTE — TELEPHONE ENCOUNTER
Kristal called for patient, step-daughter.  He saw a nurse practitioner last Thursday and he has been retaining a lot of fluid. They recommended he be seen by you.  [facility] is where he was seen [phone] is her number. He has gained 9 lbs in a week.  He is 89 years old. He is short of breath.  She stated she was going to take him to the ER if you cannot see him soon.  Do you want her to bring him in today? He is having trouble peeing at all.  He only dribbles.  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

## 2018-04-30 NOTE — PROGRESS NOTES
Referring Provider:       Patient Care Team:  Clara Ann Pallares, MD as PCP - General (Internal Medicine)  Clara Ann Pallares, MD as PCP - Family Medicine      Reason for Consultation:   Lower extremity edema  Shortness of air    History of present illness:  Dr. Hines,    I had the pleasure of seeing Elder Parker in consultation today secondary to his shortness of air and bilateral lower extremity edema. As you well know, Mr. Parker is a very pleasant 89-year-old gentleman who I have previously seen in the clinic secondary to his permanent atrial fibrillation, dual chamber pacemaker, and prior right upper extremity arterial embolism. He presents to me with complaints of about 3 weeks of bilateral lower extremity edema that he describes as at least moderate in intensity. It is traveling more proximal and extending into his abdomen. He denies any chest pain. He does report orthopnea and also moderate dyspnea on exertion. He has had no dietary changes. He was reportedly placed on 20 mg daily of Lasix therapy and per his report had no significant diuresis with that.    He was evaluated here and noted to have a negative D-dimer, negative troponin, elevated pro-BNP, and a low albumin level of 2.8.        Review of Systems  All other systems reviewed and negative.     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    • Arterial occlusion     right upper extremity s/p brachial embolectomy 12/2014   • Atrial fibrillation    • DVT (deep venous thrombosis)    • Health care maintenance    • Hyperlipidemia    • Hypertension    • Nephrolithiasis        Past Surgical History:   Past Surgical History:   Procedure Laterality Date   • HERNIA REPAIR     • PACEMAKER IMPLANTATION         Family History:   Family History   Problem Relation Age of Onset   • No Known Problems Mother    • No Known Problems Father        Social History:   Social  "History   Substance Use Topics   • Smoking status: Former Smoker   • Smokeless tobacco: Never Used      Comment: quit 1985   • Alcohol use No      Comment: NO CAFFINE USE       Home Medications:   (Not in a hospital admission)    Current Medications:   Current Outpatient Prescriptions:   •  amLODIPine (NORVASC) 5 MG tablet, Take 1 tablet by mouth Daily., Disp: 90 tablet, Rfl: 1  •  atorvastatin (LIPITOR) 20 MG tablet, Take 1 tablet by mouth daily., Disp: , Rfl:   •  COUMADIN 4 MG tablet, TAKE ONE TABLET BY MOUTH DAILY OR AS DIRECTED., Disp: 90 tablet, Rfl: 0  •  irbesartan (AVAPRO) 300 MG tablet, Take 1 tablet by mouth daily., Disp: , Rfl:   •  isosorbide mononitrate (IMDUR) 60 MG 24 hr tablet, Take 1.5 tablets by mouth Every Morning., Disp: 135 tablet, Rfl: 3  •  nitroglycerin (NITROSTAT) 0.4 MG SL tablet, Place 0.4 mg under the tongue every 5 (five) minutes as needed for chest pain. Take no more than 3 doses in 15 minutes., Disp: , Rfl:     Current Facility-Administered Medications:   •  nitroglycerin (NITROSTAT) SL tablet 0.4 mg, 0.4 mg, Sublingual, Q5 Min PRN, Leon Tanner MD  •  sodium chloride 0.9 % flush 10 mL, 10 mL, Intravenous, PRN, Leon Tanner MD     Allergies: Review of patient's allergies indicates no known allergies.      Vital Signs   Heart Rate:  [76] 76  Resp:  [17] 17  BP: (125)/(67) 125/67  Flowsheet Rows    Flowsheet Row First Filed Value   Admission Height 175.3 cm (69\") Documented at 04/30/2018 1130   Admission Weight 77.1 kg (170 lb) Documented at 04/30/2018 1130          General Appearance:    Alert, cooperative, in no acute distress   Head:    Normocephalic, without obvious abnormality, atraumatic       Neck:   No adenopathy, supple, no thyromegaly, no carotid bruit, no    JVD   Lungs:     Clear to auscultation bilaterally, no wheezes, rales, or     rhonchi    Heart:    Normal rate, regular rhythm,  No murmur, rub, or gallop   Chest Wall:    No abnormalities observed   Abdomen:  "    Normal bowel sounds, soft, non-tender, non-distended,            no rebound tenderness   Extremities:   3+ bilateral LE edema     Pulses:   Pulses palpable and equal bilaterally   Skin:   No bleeding or rash       Neurologic:   Cranial nerves 2 - 12 grossly intact, sensation intact               Results Review: I personally viewed and interpreted the patient's EKG/Telemetry data      Results from last 7 days  Lab Units 04/30/18  1136   WBC 10*3/mm3 6.44   HEMOGLOBIN g/dL 10.5*   HEMATOCRIT % 33.5*   PLATELETS 10*3/mm3 273       Results from last 7 days  Lab Units 04/30/18  1136   SODIUM mmol/L 137   POTASSIUM mmol/L 4.2   CHLORIDE mmol/L 102   CO2 mmol/L 25.7   BUN mg/dL 27*   CREATININE mg/dL 0.85   GLUCOSE mg/dL 93   CALCIUM mg/dL 8.2       Lab Results  Lab Value Date/Time   TROPONINT 0.013 04/30/2018 1136   TROPONINT <0.01 12/19/2014 2846       Assessment/Plan   Very pleasant 89-year-old gentleman with a medical history of chronic atrial fibrillation, pacemaker placement, prior arterial embolus who presents with symptoms of biventricular heart failure. Most of this appears to be right-sided heart failure with bilateral lower extremity and mild abdominal distention. He has no rales on exam. He does have an elevated proBNP.    He did not have a good response to 20 mg daily of Lasix therapy, and I wonder if this is secondary to either an inadequate dose or obstruction with BPH.    1.  Acute-on-chronic diastolic heart failure and evidence of right ventricular failure.  - I do not think we need to repeat an echo at this time with his age.  - I will start him on Bumex 1 mg p.o. daily along with potassium supplementation at 20 mEq daily.  - He will need repeat lab work in 1 week.  - I want him to get a bladder scan today to see if he has a significant amount of urine in his bladder and if obstruction is playing a role in his suboptimal urine output with low-dose diuretic therapy.    Heart Failure  Assessment  • NYHA  class III-A - There is limitation of physical activity. The patient is comfortable at rest, but ordinary activity causes fatigue, palpitations or shortness of breath.  • ACE inhibitor not prescribed for medical reasons  • Beta blocker not prescribed for medical reasons  • Diuretics prescribed  • Angiotensin receptor blocker (ARB) prescribed  • Calcium channel blockers prescribed  • Left ventricular function is normal by qualitative assessment    Plan  • The heart failure care plan was discussed with the patient today including: up-titrating HF medications    Subjective/Objective  • Physical exam findings positive for peripheral edema and elevated JVP.         I discussed the patients findings and my recommendations with the patient

## 2018-05-14 NOTE — PROGRESS NOTES
He is a little bit of kidney injury from his excessive diuretic use in addition to an elevated BUN still.  I would plan on a repeat BMP in 1-2 weeks.  I will let him know

## 2018-05-14 NOTE — PROGRESS NOTES
Referring Provider:       Patient Care Team:  Clara Ann Pallares, MD as PCP - General (Internal Medicine)  Clara Ann Pallares, MD as PCP - Family Medicine    Chief complaint:  Lower extremity edema  Shortness of air    History of present illness:  Dr. Hines,  I had the pleasure of seeing Elder Parker in follow-up today secondary to his shortness of air and bilateral lower extremity edema. As you well know, Mr. Parker is a very pleasant 89-year-old gentleman who I have previously seen in the clinic secondary to his permanent atrial fibrillation, dual chamber pacemaker, and prior right upper extremity arterial embolism. He recently presented to me with complaints of about 3 weeks of bilateral lower extremity edema that he describes as at least moderate in intensity. It was traveling more proximal and extending into his abdomen. He denied any chest pain. He did report orthopnea and also moderate dyspnea on exertion. He has had no dietary changes. He was reportedly placed on 20 mg daily of Lasix therapy and per his report had no significant diuresis with that. He was evaluated here and noted to have a negative D-dimer, negative troponin, elevated pro-BNP, and a low albumin level of 2.8.  His Lasix was moved over to Bumex therapy.    Since our evaluation, he stated that the Bumex was not working and took multiple doses (at least four doses a day for three days) with diuresis of multiple liters.  He has no residual lower extremity edema.  He is no longer taking daily Bumex.  He has not had any orthopnea or paroxysmal nocturnal dyspnea.  He has had no recent laboratory work since he changed his diuretic on his own.          Review of Systems  All other systems reviewed and negative.     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    • Arterial occlusion     right upper extremity s/p brachial embolectomy 12/2014   • Atrial  fibrillation    • DVT (deep venous thrombosis)    • Health care maintenance    • Hyperlipidemia    • Hypertension    • Nephrolithiasis        Past Surgical History:   Past Surgical History:   Procedure Laterality Date   • HERNIA REPAIR     • PACEMAKER IMPLANTATION         Family History:   Family History   Problem Relation Age of Onset   • No Known Problems Mother    • No Known Problems Father        Social History:   Social History   Substance Use Topics   • Smoking status: Former Smoker   • Smokeless tobacco: Never Used      Comment: quit 1985   • Alcohol use No      Comment: NO CAFFINE USE       Home Medications:   (Not in a hospital admission)    Current Medications:   Current Outpatient Prescriptions:   •  amLODIPine (NORVASC) 5 MG tablet, Take 1 tablet by mouth Daily., Disp: 90 tablet, Rfl: 1  •  atorvastatin (LIPITOR) 20 MG tablet, Take 1 tablet by mouth daily., Disp: , Rfl:   •  bumetanide (BUMEX) 1 MG tablet, Take 1 tablet by mouth Daily., Disp: 30 tablet, Rfl: 6  •  COUMADIN 4 MG tablet, TAKE ONE TABLET BY MOUTH DAILY OR AS DIRECTED., Disp: 90 tablet, Rfl: 0  •  irbesartan (AVAPRO) 300 MG tablet, Take 1 tablet by mouth daily., Disp: , Rfl:   •  isosorbide mononitrate (IMDUR) 60 MG 24 hr tablet, Take 1.5 tablets by mouth Every Morning., Disp: 135 tablet, Rfl: 3  •  nitroglycerin (NITROSTAT) 0.4 MG SL tablet, Place 0.4 mg under the tongue every 5 (five) minutes as needed for chest pain. Take no more than 3 doses in 15 minutes., Disp: , Rfl:   •  potassium chloride (K-DUR) 10 MEQ CR tablet, Take 1 tablet by mouth Daily., Disp: 30 tablet, Rfl: 11    Current Facility-Administered Medications:   •  nitroglycerin (NITROSTAT) SL tablet 0.4 mg, 0.4 mg, Sublingual, Q5 Min PRN, Leon Tanner MD  •  sodium chloride 0.9 % flush 10 mL, 10 mL, Intravenous, PRN, Leon Tanner MD     Allergies: Review of patient's allergies indicates no known allergies.      Vital Signs   Heart Rate:  [90] (P) 90  BP: (P)  "120/60  Flowsheet Rows    Flowsheet Row First Filed Value   Admission Height 175.3 cm (69\") Documented at 04/30/2018 1130   Admission Weight 77.1 kg (170 lb) Documented at 04/30/2018 1130          General Appearance:    Alert, cooperative, in no acute distress   Head:    Normocephalic, without obvious abnormality, atraumatic       Neck:   No adenopathy, supple, no thyromegaly, no carotid bruit, no    JVD   Lungs:     Clear to auscultation bilaterally, no wheezes, rales, or     rhonchi    Heart:    Normal rate, regular rhythm,  No murmur, rub, or gallop   Chest Wall:    No abnormalities observed   Abdomen:     Normal bowel sounds, soft, non-tender, non-distended,            no rebound tenderness   Extremities:   No lower extremity edema     Pulses:   Pulses palpable and equal bilaterally   Skin:   No bleeding or rash       Neurologic:   Cranial nerves 2 - 12 grossly intact, sensation intact               Results Review: I personally viewed and interpreted the patient's EKG/Telemetry data    EKG  5/14/18  Atrial fibrillation with ventricular pacing  When compared to prior on 4/30/2018 no change                Lab Results  Lab Value Date/Time   TROPONINT 0.013 04/30/2018 1136   TROPONINT <0.01 12/19/2014 2258     5/14/18  · Left ventricular systolic function is normal. Calculated EF = 59%. Estimated EF was in agreement with the calculated EF. Normal left ventricular cavity size and wall thickness noted. All left ventricular wall segments contract normally.  · Left ventricular diastolic function is normal.  · Left atrial volume is severely increased.  · Right atrial cavity size is severely dilated.  · The inferior vena cava is dilated.  · The aortic valve is abnormal in structure. There is calcification of the aortic valve.There is moderate thickening of the aortic valve.  · Mild to moderate tricuspid valve regurgitation is present. Estimated right ventricular systolic pressure from tricuspid regurgitation is moderately " elevated (45-55 mmHg).         Assessment/Plan   89-year-old gentleman with a medical history of chronic atrial fibrillation, pacemaker placement, prior arterial embolus who presented with symptoms of biventricular heart failure 4/2018. He did not have a good response to 20 mg daily of Lasix therapy. He was moved over to Bumex therapy.  He underwent a bladder scan without a significant amount of residual.  He actually took multiple doses of Bumex therapy in a period of 3 days and states that he got rid of all of his fluid.  He has not had repeat lab work since doing that.  He is not taking daily Bumex at this time.    1.  Acute-on-chronic diastolic heart failure and evidence of right ventricular failure.   -Transthoracic echocardiogram shows that his ejection fraction is still preserved and that his RV looks fine.   -I'm going to get a BMP today.  His renal function is normal I'll restart his Bumex and potassium  2.  Chronic atrial fibrillation; prior dual chamber pacemaker implantation.  Continue coumadin therapy lifelong.  He has not had any recent falls or blood loss in his stool.    2.  Essential hypertension  3.  Hyperlipidemia: Statin therapy monitored by primary       Heart Failure  Assessment  • NYHA class I - There is no limitation of physical activity. Physical activity does not cause fatigue, palpitations or shortness of breath.  • ACE inhibitor not prescribed for medical reasons  • Beta blocker not prescribed for medical reasons  • Diuretics prescribed  • Angiotensin receptor blocker (ARB) prescribed  • Calcium channel blockers prescribed  • Left ventricular function is normal by qualitative assessment    Plan  • The heart failure care plan was discussed with the patient today including: up-titrating HF medications    Subjective/Objective  • Physical exam findings positive for peripheral edema and elevated JVP.         I discussed the patients findings and my recommendations with the patient

## 2018-05-17 NOTE — TELEPHONE ENCOUNTER
The pt. Called and was wanting the results and I gave them the following information.   FYI:    Notes Recorded by Dewayne Mccray MD on 5/14/2018 at 3:56 PM EDT  He is a little bit of kidney injury from his excessive diuretic use in addition to an elevated BUN still.  I would plan on a repeat BMP in 1-2 weeks.  I will let him know    Thanks   Rosibel MCCALL

## 2018-05-19 NOTE — ED TRIAGE NOTES
Patient presents to ER via private vehicle from home.  Patient reports right shoulder pain without injury for three days.  Patient is very [place] and ESL, primary language is [place].

## 2018-05-19 NOTE — ED PROVIDER NOTES
"EMERGENCY DEPARTMENT ENCOUNTER    CHIEF COMPLAINT  Chief Complaint: R shoulder pain  History given by: pt/ family I present at bedside  History limited by: N/A  Room Number: 05/05  PMD: Clara Ann Pallares, MD      HPI:  Pt is a 89 y.o. male who presents complaining of constant R shoulder pain that began approximately 3 days ago without any known injury/ trauma to affected site. Pt states the pain is located at R scapula and radiates toward the front of his chest. Pt denies any definitive activity that aggravates/ alleviates the pain. Family is present at bedside and helps to provide episode details. Family states that pt's pain has progressively worsened since onset. Pt takes Coumadin daily. On evaluation, pt denies SOA, N/V, or any other pertinent symptoms.     Duration: 3 days   Onset: gradual   Timing: constant   Location: R shoulder   Radiation: radiates from scapula to the front of the chest    Quality: \"pain\"  Intensity/Severity: moderate   Progression: progressive worsening   Associated Symptoms: None reported   Aggravating Factors: None reported   Alleviating Factors: None reported   Previous Episodes: Pt denies any injury/ trauma that would have caused the current pain.  Treatment before arrival: Pt takes coumadin daily.     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    • Arterial occlusion    • Atrial fibrillation    • DVT (deep venous thrombosis)    • Health care maintenance    • Hyperlipidemia    • Hypertension    • Nephrolithiasis        PAST SURGICAL HISTORY  Past Surgical History: "   Procedure Laterality Date   • HERNIA REPAIR     • PACEMAKER IMPLANTATION         FAMILY HISTORY  Family History   Problem Relation Age of Onset   • No Known Problems Mother    • No Known Problems Father        SOCIAL HISTORY  Social History     Social History   • Marital status:      Spouse name: N/A   • Number of children: N/A   • Years of education: N/A     Occupational History   • Not on file.     Social History Main Topics   • Smoking status: Former Smoker   • Smokeless tobacco: Never Used      Comment: quit 1985   • Alcohol use No      Comment: NO CAFFINE USE   • Drug use: Unknown   • Sexual activity: Not on file     Other Topics Concern   • Not on file     Social History Narrative   • No narrative on file       ALLERGIES  Patient has no known allergies.    REVIEW OF SYSTEMS  Review of Systems   Constitutional: Negative.  Negative for chills and fever.   HENT: Negative.  Negative for sore throat.    Eyes: Negative.    Respiratory: Negative.  Negative for cough and shortness of breath.    Cardiovascular: Negative.  Negative for chest pain.   Gastrointestinal: Negative.  Negative for nausea and vomiting.   Genitourinary: Negative.  Negative for dysuria.   Musculoskeletal: Positive for arthralgias (R shoulder ). Negative for back pain.   Skin: Negative.  Negative for rash.   Neurological: Negative.  Negative for headaches.   All other systems reviewed and are negative.      PHYSICAL EXAM  ED Triage Vitals [05/19/18 0839]   Temp Heart Rate Resp BP SpO2   96.1 °F (35.6 °C) 79 18 -- 97 %      Temp src Heart Rate Source Patient Position BP Location FiO2 (%)   Tympanic Monitor -- -- --       Physical Exam   Constitutional: He is oriented to person, place, and time and well-developed, well-nourished, and in no distress.   HENT:   Head: Normocephalic and atraumatic.   Eyes: EOM are normal. Pupils are equal, round, and reactive to light.   Neck: Normal range of motion. Neck supple. No JVD present.    Cardiovascular: Normal rate, regular rhythm, normal heart sounds and intact distal pulses.    Pulmonary/Chest: Effort normal and breath sounds normal. No respiratory distress. He exhibits no tenderness.   Lungs clear to auscultation bilaterally    Abdominal: Soft. There is no tenderness. There is no rebound and no guarding.   Musculoskeletal: Normal range of motion. He exhibits no edema, tenderness or deformity (No gross deformity ).   Pt has good ROM of RUE  No obvious signs of trauma to extremities x4   Neurological: He is alert and oriented to person, place, and time. He has normal sensation and normal strength.   Good  strength bilaterally  Motor and sensory intact    Skin: Skin is warm and dry.   Psychiatric: Mood and affect normal.   Nursing note and vitals reviewed.      LAB RESULTS  Lab Results (last 24 hours)     Procedure Component Value Units Date/Time    CBC & Differential [[id number]] Collected:  05/19/18 0904    Specimen:  Blood Updated:  05/19/18 0922    Narrative:       The following orders were created for panel order CBC & Differential.  Procedure                               Abnormality         Status                     ---------                               -----------         ------                     CBC Auto Differential[[id number]]        Abnormal            Final result                 Please view results for these tests on the individual orders.    Comprehensive Metabolic Panel [[id number]]  (Abnormal) Collected:  05/19/18 0904    Specimen:  Blood Updated:  05/19/18 0940     Glucose 99 mg/dL      BUN 33 (H) mg/dL      Creatinine 1.09 mg/dL      Sodium 139 mmol/L      Potassium 4.3 mmol/L      Chloride 101 mmol/L      CO2 24.9 mmol/L      Calcium 8.6 mg/dL      Total Protein 6.2 g/dL      Albumin 3.30 (L) g/dL      ALT (SGPT) 9 U/L      AST (SGOT) 8 U/L      Alkaline Phosphatase 104 U/L      Total Bilirubin 0.7 mg/dL      eGFR Non African Amer 63 mL/min/1.73      Globulin 2.9 gm/dL       A/G Ratio 1.1 g/dL      BUN/Creatinine Ratio 30.3 (H)     Anion Gap 13.1 mmol/L     Narrative:       The MDRD GFR formula is only valid for adults with stable renal function between ages 18 and 70.    Protime-INR [[id number]]  (Abnormal) Collected:  05/19/18 0904    Specimen:  Blood Updated:  05/19/18 0930     Protime 25.1 (H) Seconds      INR 2.32 (H)    Troponin [[id number]]  (Normal) Collected:  05/19/18 0904    Specimen:  Blood Updated:  05/19/18 0940     Troponin T <0.010 ng/mL     Narrative:       Troponin T Reference Ranges:  Less than 0.03 ng/mL:    Negative for AMI  0.03 to 0.09 ng/mL:      Indeterminant for AMI  Greater than 0.09 ng/mL: Positive for AMI    CBC Auto Differential [[id number]]  (Abnormal) Collected:  05/19/18 0904    Specimen:  Blood Updated:  05/19/18 0922     WBC 6.06 10*3/mm3      RBC 3.76 (L) 10*6/mm3      Hemoglobin 11.2 (L) g/dL      Hematocrit 35.1 (L) %      MCV 93.4 fL      MCH 29.8 pg      MCHC 31.9 (L) g/dL      RDW 17.7 (H) %      RDW-SD 59.7 (H) fl      MPV 9.3 fL      Platelets 248 10*3/mm3      Neutrophil % 55.2 %      Lymphocyte % 28.1 %      Monocyte % 14.7 (H) %      Eosinophil % 1.3 %      Basophil % 0.7 %      Immature Grans % 0.2 %      Neutrophils, Absolute 3.35 10*3/mm3      Lymphocytes, Absolute 1.70 10*3/mm3      Monocytes, Absolute 0.89 10*3/mm3      Eosinophils, Absolute 0.08 10*3/mm3      Basophils, Absolute 0.04 10*3/mm3      Immature Grans, Absolute 0.01 10*3/mm3           I ordered the above labs and reviewed the results    RADIOLOGY  XR Chest 2 View   FINDINGS: There are no prior exams for comparison. The heart is enlarged  with a pacemaker in place. There is an area of focal parenchymal density  in the right upper lobe laterally involving an area measuring up to 5.8  cm in size. This represents either dense focal pneumonia or possibly  underlying mass and continued follow-up evaluation is recommended to  ensure appropriate resolution. Otherwise evaluation  with CT scan may be helpful.      XR Shoulder 2+ View Right   FINDINGS: There are mild degenerative changes at the glenohumeral joint  with some marginal spurring of the glenoid. There is an adjacent  parenchymal abnormality in the right upper lobe, possibly representing  localized pneumonia or underlying mass and further evaluation with CT  scan may be helpful.          CT Chest:  The CT scan was performed as an emergency procedure through the chest  and demonstrates the followin. There are very severe changes of pulmonary emphysema. There is an  area of masslike density in the right upper lobe laterally measuring up  to 5.4 x 5.4 cm and there are no internal air bronchograms. The  appearance is most consistent with an abnormal mass and highly  suspicious for lung carcinoma.  2. There are also enlarged mediastinal lymph nodes measuring up to 4 cm  and also highly suspicious for metastatic disease in this setting. There  is no hilar or axillary adenopathy. There is no pericardial effusion and  the CT images through the upper liver demonstrates several small and  somewhat vague low-density lesions which measure up to 1.3 cm. These  remain nonspecific and I cannot completely exclude the possibility of  metastatic disease. The spleen appears normal. There is slight  prominence of both adrenal glands without evidence of focal mass.  3. At bone windows, no suspicious bone lesions are seen.    I ordered the above noted radiological studies. Interpreted by radiologist. Reviewed by me in PACS.       PROCEDURES  Procedures  EKG           EKG time: 0845  Rhythm/Rate: paced rhythm at 80 with occasional PVC  P waves and ME:   QRS, axis: LAD   ST and T waves: paced     Interpreted Contemporaneously by me, independently viewed  Unchanged compared to prior 2014    PROGRESS AND CONSULTS     0852  Ordered IVF, labs, Chest XR, and R Shoulder XR for further evaluation.     1005  Rechecked pt who is resting comfortably and  in no acute distress. Discussed lab/ radiology results with pt/ family. Discussed the Chest XR is concerning for a mass and discussed plan to perform CT Chest for further evaluation. Pt/ family agree with the plan at this time.     1007  Ordered CT Chest for further evaluation.     1055  Rechecked pt who is resting comfortably and in no acute distress. Discussed lab/ radiology results with pt/ family. Discussed CT Chest showed a R upper lung mass that is consistent with cancer. Discussed plan to discharge pt with referral to oncology. PT/ family understand and agree to plan, all questions addressed at this time.    MEDICAL DECISION MAKING  Results were reviewed/discussed with the patient and they were also made aware of online access. Pt also made aware that some labs, such as cultures, will not be resulted during ER visit and follow up with PMD is necessary.     MDM  Number of Diagnoses or Management Options  Acute pain of right shoulder:   Mass of upper lobe of right lung:      Amount and/or Complexity of Data Reviewed  Clinical lab tests: ordered and reviewed (BUN = 33  Troponin < 0.010)  Tests in the radiology section of CPT®: ordered and reviewed (R shoulder XR  FINDINGS: There are mild degenerative changes at the glenohumeral joint with some marginal spurring of the glenoid. There is an adjacent parenchymal abnormality in the right upper lobe, possibly representing localized pneumonia or underlying mass and further evaluation with CT scan may be helpful.    Chest XR:   FINDINGS: There are no prior exams for comparison. The heart is enlarged with a pacemaker in place. There is an area of focal parenchymal density in the right upper lobe laterally involving an area measuring up to 5.8 cm in size. This represents either dense focal pneumonia or possibly underlying mass and continued follow-up evaluation is recommended to ensure appropriate resolution. Otherwise evaluation with CT scan may be helpful.    CT Chest  shows a R lung mass consistent with cancer. )  Tests in the medicine section of CPT®: ordered and reviewed (Refer to procedure )  Independent visualization of images, tracings, or specimens: yes           DIAGNOSIS  Final diagnoses:   Mass of upper lobe of right lung   Acute pain of right shoulder       DISPOSITION  DISCHARGE    Patient discharged in stable condition.    Reviewed implications of results, diagnosis, meds, responsibility to follow up, warning signs and symptoms of possible worsening, potential complications and reasons to return to ER.    Patient/Family voiced understanding of above instructions.    Discussed plan for discharge, as there is no emergent indication for admission. Patient referred to primary care provider for BP management due to today's BP. Pt/family is agreeable and understands need for follow up and repeat testing.  Pt is aware that discharge does not mean that nothing is wrong but it indicates no emergency is present that requires admission and they must continue care with follow-up as given below or physician of their choice.     FOLLOW-UP  Clara Ann Pallares, MD  [address]  [facility] [id number] [phone]          [facility]  [address]  [facility] [address] [phone]  Schedule an appointment as soon as possible for a visit   lung mass         Medication List      New Prescriptions    HYDROcodone-acetaminophen 5-325 MG per tablet  Commonly known as:  NORCO  Take 1 tablet by mouth Every 6 (Six) Hours As Needed for Moderate Pain .        Changed    amLODIPine 5 MG tablet  Commonly known as:  NORVASC  Take 1 tablet by mouth Daily.  What changed:  how much to take              Latest Documented Vital Signs:  As of 11:03 AM  BP- 145/83 HR- 60 Temp- 96.1 °F (35.6 °C) (Tympanic) O2 sat- 94%    --  Documentation assistance provided by chip Dhillon for   Sam.  Information recorded by the scribe was done at my direction and has been verified and validated by me.               Durga Dhillon  05/19/18 1104       Miller Vargas MD  05/19/18 [id number]

## 2018-05-24 PROBLEM — Z79.01 ANTICOAGULATED ON COUMADIN: Status: ACTIVE | Noted: 2018-01-01

## 2018-05-24 PROBLEM — R91.8 MASS OF UPPER LOBE OF RIGHT LUNG: Status: ACTIVE | Noted: 2018-01-01

## 2018-05-24 NOTE — PROGRESS NOTES
Subjective     REASON FOR CONSULTATION:  Right upper lobe lung mass with mediastinal adenopathy on CT scan of the chest 5/19/2018  Provide an opinion on any further workup or treatment                             REQUESTING PHYSICIAN:  Clara Pallares, MD    RECORDS OBTAINED:  Records of the patients history including those obtained from the referring provider were reviewed and summarized in detail.    HISTORY OF PRESENT ILLNESS:  The patient is a 89 y.o. year old male who is here for an opinion about the above issue.  He had recently been evaluated in the emergency room for chest pain and shortness of breath.  He underwent a CT scan with IV contrast that revealed a 5-1/2 cm mass in the right upper lobe with some mediastinal adenopathy measuring up to 4 cm.  He is here today for his initial visit.  He has not undergone any type of biopsy procedure.    He is accompanied today by his stepdaughter who was very helpful with providing some background and history.  The patient seems quite vigorous for his age but he does have significant hearing loss and vision problems due to macular degeneration.  He also has a permanent pacemaker and is on Coumadin anticoagulation for atrial fibrillation.    We discussed the situation and offered the options of focusing on comfort and quality of life with palliative care versus pursuing a diagnosis and discussing treatment options.  The patient is very much inclined to pursue a biopsy and possible treatment.      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    Right arm   • H/O BPH (benign prostatic hyperplasia)    • H/O Pulmonary embolism    • H/O Pulmonary granuloma 03/2008     Right ML via CT   • Health care maintenance    • Hyperlipidemia    • Hypertension    • Nephrolithiasis 2011, 2012   • Secondary hyperparathyroidism    • Skin cancer     Basal cell        Past Surgical History:   Procedure Laterality Date   • CIRCUMCISION  2007   • COLONOSCOPY      x2   • EMBOLECTOMY Right 12/2014    Upper arm, removed clot   • EYE SURGERY Bilateral 01/2007   • HERNIA REPAIR Bilateral     Inguinal   • HYDROCELE EXCISION / REPAIR Bilateral     Repair   • ORCHIECTOMY      Unilatearl 9+ years ago   • PACEMAKER IMPLANTATION  2005        Current Outpatient Prescriptions on File Prior to Visit   Medication Sig Dispense Refill   • amLODIPine (NORVASC) 5 MG tablet Take 1 tablet by mouth Daily. (Patient taking differently: Take 2.5 mg by mouth Daily.) 90 tablet 1   • atorvastatin (LIPITOR) 20 MG tablet Take 1 tablet by mouth daily.     • COUMADIN 4 MG tablet TAKE ONE TABLET BY MOUTH DAILY OR AS DIRECTED. 90 tablet 0   • HYDROcodone-acetaminophen (NORCO) 5-325 MG per tablet Take 1 tablet by mouth Every 6 (Six) Hours As Needed for Moderate Pain . 20 tablet 0   • irbesartan (AVAPRO) 300 MG tablet Take 1 tablet by mouth daily.     • isosorbide mononitrate (IMDUR) 60 MG 24 hr tablet Take 1.5 tablets by mouth Every Morning. 135 tablet 3   • nitroglycerin (NITROSTAT) 0.4 MG SL tablet Place 0.4 mg under the tongue every 5 (five) minutes as needed for chest pain. Take no more than 3 doses in 15 minutes.     • [DISCONTINUED] bumetanide (BUMEX) 1 MG tablet Take 1 mg by mouth Daily.       Current Facility-Administered Medications on File Prior to Visit   Medication Dose Route Frequency Provider Last Rate Last Dose   • nitroglycerin (NITROSTAT) SL tablet 0.4 mg  0.4 mg Sublingual Q5 Min PRN Leon Tanner MD       • sodium chloride 0.9 % flush 10 mL  10 mL Intravenous PRN Leon Tanner MD            ALLERGIES:  No Known Allergies     Social History     Social History   • Marital status:      Spouse  "name: Sulema     Occupational History   •  Retired     Social History Main Topics   • Smoking status: Former Smoker   • Smokeless tobacco: Never Used      Comment: quit 1985   • Alcohol use No      Comment: NO CAFFINE USE   • Drug use: Unknown     Other Topics Concern   • Not on file        Family History   Problem Relation Age of Onset   • No Known Problems Mother    • No Known Problems Father         Review of Systems   Constitutional: Negative for activity change, chills, fatigue and fever.   HENT: Positive for hearing loss. Negative for mouth sores, trouble swallowing and voice change.    Eyes: Positive for visual disturbance. Negative for pain.   Respiratory: Positive for cough. Negative for shortness of breath and wheezing.    Cardiovascular: Positive for leg swelling. Negative for chest pain and palpitations.   Gastrointestinal: Negative for abdominal pain, constipation, diarrhea, nausea and vomiting.   Endocrine: Positive for cold intolerance.   Genitourinary: Positive for frequency. Negative for difficulty urinating and urgency.   Musculoskeletal: Positive for gait problem. Negative for arthralgias and joint swelling.   Skin: Negative for rash.   Neurological: Positive for weakness. Negative for dizziness, seizures and headaches.   Hematological: Negative for adenopathy. Does not bruise/bleed easily.   Psychiatric/Behavioral: Negative for behavioral problems and confusion. The patient is not nervous/anxious.         Objective     Vitals:    05/24/18 1407   BP: 123/63   Pulse: 62   Resp: 14   Temp: 98.5 °F (36.9 °C)   TempSrc: Oral   SpO2: 95%   Weight: 75.9 kg (167 lb 6.4 oz)   Height: 172 cm (67.72\")   PainSc: 0-No pain     Current Status 5/24/2018   ECOG score 0       Physical Exam   Constitutional: He is oriented to person, place, and time. He appears well-developed and well-nourished. No distress.   HENT:   Head: Normocephalic.   Eyes: Conjunctivae and EOM are normal. Pupils are equal, round, and reactive " to light. No scleral icterus.   Neck: Normal range of motion. Neck supple. No JVD present. No thyromegaly present.   Cardiovascular: Normal rate and regular rhythm.  Exam reveals no gallop and no friction rub.    No murmur heard.  Permanent pacemaker   Pulmonary/Chest: Effort normal and breath sounds normal. He has no wheezes. He has no rales.   Abdominal: Soft. He exhibits no distension and no mass. There is no tenderness.   Musculoskeletal: Normal range of motion. He exhibits edema. He exhibits no deformity.   1+ ankle edema bilaterally   Lymphadenopathy:     He has no cervical adenopathy.   Neurological: He is alert and oriented to person, place, and time. He has normal reflexes. No cranial nerve deficit.   Skin: Skin is warm and dry. No rash noted. No erythema.   Psychiatric: He has a normal mood and affect. His behavior is normal. Judgment normal.         RECENT LABS:  Hematology WBC   Date Value Ref Range Status   05/19/2018 6.06 4.50 - 10.70 10*3/mm3 Final     RBC   Date Value Ref Range Status   05/19/2018 3.76 (L) 4.60 - 6.00 10*6/mm3 Final     Hemoglobin   Date Value Ref Range Status   05/19/2018 11.2 (L) 13.7 - 17.6 g/dL Final     Hematocrit   Date Value Ref Range Status   05/19/2018 35.1 (L) 40.4 - 52.2 % Final     Platelets   Date Value Ref Range Status   05/19/2018 248 140 - 500 10*3/mm3 Final          Lab Results   Component Value Date    GLUCOSE 99 05/19/2018    BUN 33 (H) 05/19/2018    CREATININE 1.09 05/19/2018    EGFRIFNONA 63 05/19/2018    BCR 30.3 (H) 05/19/2018    K 4.3 05/19/2018    CO2 24.9 05/19/2018    CALCIUM 8.6 05/19/2018    ALBUMIN 3.30 (L) 05/19/2018    LABIL2 1.1 05/19/2018    AST 8 05/19/2018    ALT 9 05/19/2018       CT SCAN OF THE CHEST WITH INTRAVENOUS CONTRAST   5/19/2018     HISTORY: Right-sided chest pain. Focal parenchymal density in right  upper lobe noted on chest x-ray.     The CT scan was performed as an emergency procedure through the chest  and demonstrates the  followin. There are very severe changes of pulmonary emphysema. There is an  area of masslike density in the right upper lobe laterally measuring up  to 5.4 x 5.4 cm and there are no internal air bronchograms. The  appearance is most consistent with an abnormal mass and highly  suspicious for lung carcinoma.  2. There are also enlarged mediastinal lymph nodes measuring up to 4 cm  and also highly suspicious for metastatic disease in this setting. There  is no hilar or axillary adenopathy. There is no pericardial effusion and  the CT images through the upper liver demonstrates several small and  somewhat vague low-density lesions which measure up to 1.3 cm. These  remain nonspecific and I cannot completely exclude the possibility of  metastatic disease. The spleen appears normal. There is slight  prominence of both adrenal glands without evidence of focal mass.  3. At bone windows, no suspicious bone lesions are seen.    Images personally reviewed    Assessment/Plan     1.  Right upper lobe lung mass with mediastinal adenopathy.  This was detected on CT scan of the chest from 2018.  The patient has not had any type of biopsy procedure but certainly the appearance is very suggestive of a lung cancer.  2.  Chronic atrial fibrillation with chronic anticoagulation on Coumadin and permanent pacemaker.    Recommendations  1.  We discussed the situation at length with the patient and his stepdaughter.  While he certainly is of an advanced age, he remains very active and is able to take care of himself with all of his activities of daily living and also is a gourmet cook and artist.  After a full discussion of the situation and whether or not to pursue a biopsy versus focusing on comfort and quality of life, the patient very much desire to at least pursue the biopsy and understand more about what we are dealing with here prior to making a final decision regarding whether or not he would pursue treatment.  2.  We  therefore will schedule an outpatient CT guided needle biopsy of the lung.  3.  He will need to discontinue Coumadin 5 days prior to the biopsy.  4.  He may resume Coumadin the night of the biopsy if he is not having any hemoptysis.  5.  We will schedule follow-up appointment in our office a week or so after the biopsy to discuss the results and have further discussion regarding possible treatment options based on those results.  6.  If the biopsy is positive for malignancy, we may also try to arrange a PET scan for staging prior to his next follow-up office visit.    Thanks for allowing us to see this fascinating gentleman in consultation.

## 2018-05-29 NOTE — PROGRESS NOTES
Pt was seen at [place] by Dr. Alva for an initial medical oncology appointment for a lung mass. The pt's step-daughter, Rosaura Butterfield, completed the Distress Questionnaire on his behalf and scored 1/10, marking a few physical concerns. Currently, the pt does not have a living will scanned into his medical record. OSW remains available as needs arise.    Celina Mejia, [facility]  Oncology Social Worker

## 2018-06-06 NOTE — H&P
Name: Elder Parker ADMIT: 2018   : 1923  PCP: Clara Ann Pallares, MD    MRN: [id number] LOS: 0 days   AGE/SEX: 89 y.o. male  ROOM: Room/bed info not found       Chief complaint RIGHT LUNG MASS    Present Illness or Internal History:  Patient is a 89 y.o. male presents with RIGHT LUNG MASS    Past Surgical History:  Past Surgical History:   Procedure Laterality Date   • CIRCUMCISION     • COLONOSCOPY      x2   • EMBOLECTOMY Right 2014    Upper arm, removed clot   • EYE SURGERY Bilateral 2007   • HERNIA REPAIR Bilateral     Inguinal   • HYDROCELE EXCISION / REPAIR Bilateral     Repair   • ORCHIECTOMY      Unilatearl 9+ years ago   • PACEMAKER IMPLANTATION         Past Medical History:  Past Medical History:   Diagnosis Date   • Abdominal hernia    • Arterial occlusion     right upper extremity s/p brachial embolectomy 2014   • Atrial fibrillation    • DVT (deep venous thrombosis)    • H/O AAA (abdominal aortic aneurysm) 2015    Distal   • H/O Arterial embolism and thrombosis of upper extremity     Right arm   • H/O BPH (benign prostatic hyperplasia)    • H/O Pulmonary embolism    • H/O Pulmonary granuloma 2008    Right ML via CT   • Health care maintenance    • Hyperlipidemia    • Hypertension    • Nephrolithiasis ,    • Secondary hyperparathyroidism    • Skin cancer     Basal cell       Home Medications:    (Not in a hospital admission)    Allergies:  Patient has no known allergies.    Family History:  Family History   Problem Relation Age of Onset   • No Known Problems Mother    • No Known Problems Father        Social History:  Social History   Substance Use Topics   • Smoking status: Former Smoker   • Smokeless tobacco: Never Used      Comment: quit    • Alcohol use No      Comment: NO CAFFINE USE        Objective     Physical Exam:      General Appearance:    Alert, cooperative, in no acute distress   Head:    Normocephalic, without obvious abnormality,  atraumatic   Eyes:            Lids and lashes normal, conjunctivae and sclerae normal, no   icterus, no pallor, corneas clear, PERRLA   Ears:    Ears appear intact with no abnormalities noted   Throat:   No oral lesions, no thrush, oral mucosa moist   Neck:   No adenopathy, supple, trachea midline, no thyromegaly, no   carotid bruit, no JVD   Back:     No kyphosis present, no scoliosis present, no skin lesions,      erythema or scars, no tenderness to percussion or                   palpation,   range of motion normal   Lungs:     Clear to auscultation,respirations regular, even and                  unlabored    Heart:    Regular rhythm and normal rate, normal S1 and S2, no            murmur, no gallop, no rub, no click   Chest Wall:    No abnormalities observed   Abdomen:     Normal bowel sounds, no masses, no organomegaly, soft        non-tender, non-distended, no guarding, no rebound                tenderness   Rectal:     Deferred   Extremities:   Moves all extremities well, no edema, no cyanosis, no             redness   Pulses:   Pulses palpable and equal bilaterally   Skin:   No bleeding, bruising or rash   Lymph nodes:   No palpable adenopathy   Neurologic:   Cranial nerves 2 - 12 grossly intact, sensation intact, DTR       present and equal bilaterally       Vital Signs       Anticipated Surgical Procedure:    RIGHT LUNG BIOPSY.  The risks, benefits and alternatives of this procedure have been discussed with the patient or responsible party: Yes        Sanket Valladares MD  06/06/18  8:49 AM

## 2018-06-06 NOTE — NURSING NOTE
D/C instructions discussed and understood by patient and family member. Dressing dry and intact. No SOA. No distress.

## 2018-06-06 NOTE — NURSING NOTE
Returned from CT. Dressing to right upper arm pit dry and intact. No complaints of chest pain or SOA. No distress noted. Breakfast served.

## 2018-06-06 NOTE — DISCHARGE INSTRUCTIONS
EDUCATION /DISCHARGE INSTRUCTIONS  CT/US guided biopsy:  A biopsy is a procedure done to remove tissue for further analysis.  Before images are taken to locate the target area.  Images can be obtained using ultrasound, CT or MRI.  A physician will clean your skin with antiseptic soap, place a sterile towel around the site and administer a local anesthetic to numb the area.  The physician will then insert a special needle.  Sometimes images are taken of the needle after it is inserted to ensure the needle is in the correct area to be biopsied.   A sample is obtained and sent to the laboratory for study.  Occasionally the laboratory is unable to make a diagnosis from the sample and the procedure may need to be repeated.  Within a week the radiologist will send a report to your physician.  A pathologist will also examine the tissue and send a report.      Risks of the procedure include but are not limited to:   *  Bleeding    *  Infection   *  Puncture of surrounding organs *  Death     *  Lung collapse if the biopsy is near the chest which may require insertion of a       tube to re-inflate the lung if severe.      Benefits of the procedure:  Using x-ray helps to locate the area that requires a biopsy. The procedure is less invasive than a surgical procedure, there are no large incisions and it does not require anesthesia.      Alternatives to the procedure:  A biopsy can be performed surgically.  Risks of a surgical biopsy include exposure to anesthesia, infection, excessive bleeding and injury to abdominal organs.  A benefit of surgical biopsy is the ability to see the area to be biopsied and remove of a larger piece of tissue.    THIS EDUCATION INFORMATION WAS REVIEWED PRIOR TO PROCEDURE AND CONSENT. Patient initials__________________Time___________________    Post Procedure:    *  Expect the biopsy site may be tender up to one week.    *  Rest today (no pushing pulling or straining).   *  Slowly increase activity  tomorrow.    *  If you received sedation do not drive for 24 hours.   *  Keep dressing clean and dry.   *  Leave dressing on puncture site for 24 hours.    *  You may shower when dressing removed.    Call your doctor if experiencing:   *  Signs of infection such as redness, swelling, excessive pain and / or foul        smelling drainage from the puncture site.   *  Chills or fever over 101 degrees (by mouth).   *  Unrelieved pain.   *  Any new or severe symptoms.   *  If experiencing sudden / severe shortness of breath or chest pain go to the       nearest emergency room.     Following the procedure:     Follow-up with the ordering physician as directed.    Continue to take other medications as directed by your physician unless    otherwise instructed.   If applicable, resume taking your Coumadin or Aspirin on Friday June 7, 2018 after 12 noon    If you have any concerns please call the Radiology Nurses Desk at [phone].  You are the most important factor in your recovery.  Follow the above instructions carefully.

## 2018-06-14 PROBLEM — C34.90 NON-SMALL CELL LUNG CANCER (HCC): Status: ACTIVE | Noted: 2018-01-01

## 2018-06-14 NOTE — PROGRESS NOTES
Subjective     REASON FOR FOLLOW UP:  Non-small cell lung carcinoma presenting with a right upper lobe lung mass with mediastinal adenopathy on CT scan of the chest 5/19/2018    HISTORY OF PRESENT ILLNESS:  The patient is a 89 y.o. year old male who is here for an opinion about the above issue.  He had recently been evaluated in the emergency room for chest pain and shortness of breath.  He underwent a CT scan with IV contrast that revealed a 5-1/2 cm mass in the right upper lobe with some mediastinal adenopathy measuring up to 4 cm.  He is here today for his initial visit.  He has not undergone any type of biopsy procedure.    He is accompanied today by his stepdaughter who was very helpful with providing some background and history.  The patient seems quite vigorous for his age but he does have significant hearing loss and vision problems due to macular degeneration.  He also has a permanent pacemaker and is on Coumadin anticoagulation for atrial fibrillation.    Since his initial visit here, he did successfully undergo a CT-guided needle biopsy of the right lung mass which confirmed non-small cell carcinoma most likely from lung origin although the immunohistochemical stains were nonconclusive.  Also, they did not differentiate between squamous and adenocarcinoma.    The patient still seems relatively comfortable but is having some symptoms of occasional chest pain and cough.      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    Right arm   • H/O BPH (benign prostatic hyperplasia)    • H/O Pulmonary embolism    • H/O Pulmonary granuloma 03/2008    Right ML via CT   • Health care maintenance    •  Hyperlipidemia    • Hypertension    • Nephrolithiasis 2011, 2012   • Secondary hyperparathyroidism    • Skin cancer     Basal cell        Past Surgical History:   Procedure Laterality Date   • CIRCUMCISION  2007   • COLONOSCOPY      x2   • EMBOLECTOMY Right 12/2014    Upper arm, removed clot   • EYE SURGERY Bilateral 01/2007   • HERNIA REPAIR Bilateral     Inguinal   • HYDROCELE EXCISION / REPAIR Bilateral     Repair   • ORCHIECTOMY      Unilatearl 9+ years ago   • PACEMAKER IMPLANTATION  2005        Current Outpatient Prescriptions on File Prior to Visit   Medication Sig Dispense Refill   • amLODIPine (NORVASC) 5 MG tablet Take 1 tablet by mouth Daily. (Patient taking differently: Take 2.5 mg by mouth Daily.) 90 tablet 1   • atorvastatin (LIPITOR) 20 MG tablet Take 1 tablet by mouth daily.     • COUMADIN 4 MG tablet TAKE ONE TABLET BY MOUTH DAILY OR AS DIRECTED. 90 tablet 0   • HYDROcodone-acetaminophen (NORCO) 5-325 MG per tablet Take 1 tablet by mouth Every 6 (Six) Hours As Needed for Moderate Pain . 20 tablet 0   • irbesartan (AVAPRO) 300 MG tablet Take 1 tablet by mouth daily.     • nitroglycerin (NITROSTAT) 0.4 MG SL tablet Place 0.4 mg under the tongue every 5 (five) minutes as needed for chest pain. Take no more than 3 doses in 15 minutes.     • [DISCONTINUED] isosorbide mononitrate (IMDUR) 60 MG 24 hr tablet Take 1.5 tablets by mouth Every Morning. 135 tablet 3     Current Facility-Administered Medications on File Prior to Visit   Medication Dose Route Frequency Provider Last Rate Last Dose   • nitroglycerin (NITROSTAT) SL tablet 0.4 mg  0.4 mg Sublingual Q5 Min PRN Leon Tanner MD       • sodium chloride 0.9 % flush 10 mL  10 mL Intravenous PRN Leon Tanner MD            ALLERGIES:  No Known Allergies     Social History     Social History   • Marital status:      Spouse name: Sulema     Occupational History   •  Retired     Social History Main Topics   • Smoking status: Former Smoker   •  "Smokeless tobacco: Never Used      Comment: quit 1985   • Alcohol use No      Comment: NO CAFFINE USE   • Drug use: Unknown     Other Topics Concern   • Not on file        Family History   Problem Relation Age of Onset   • No Known Problems Mother    • No Known Problems Father         Review of Systems   Constitutional: Negative for activity change, chills, fatigue and fever.   HENT: Positive for hearing loss. Negative for mouth sores, trouble swallowing and voice change.    Eyes: Positive for visual disturbance. Negative for pain.   Respiratory: Positive for cough. Negative for shortness of breath and wheezing.    Cardiovascular: Positive for leg swelling. Negative for chest pain and palpitations.   Gastrointestinal: Negative for abdominal pain, constipation, diarrhea, nausea and vomiting.   Endocrine: Positive for cold intolerance.   Genitourinary: Positive for frequency. Negative for difficulty urinating and urgency.   Musculoskeletal: Positive for gait problem. Negative for arthralgias and joint swelling.   Skin: Negative for rash.   Neurological: Positive for weakness. Negative for dizziness, seizures and headaches.   Hematological: Negative for adenopathy. Does not bruise/bleed easily.   Psychiatric/Behavioral: Negative for behavioral problems and confusion. The patient is not nervous/anxious.         Objective     Vitals:    06/14/18 1310   BP: 128/65   Pulse: 80   Resp: 16   Temp: 98.9 °F (37.2 °C)   TempSrc: Oral   SpO2: 93%   Weight: 76 kg (167 lb 8 oz)   Height: 172 cm (67.72\")   PainSc: 0-No pain     Current Status 6/14/2018   ECOG score 0       Physical Exam   Constitutional: He is oriented to person, place, and time. He appears well-developed and well-nourished. No distress.   HENT:   Head: Normocephalic.   Eyes: Conjunctivae and EOM are normal. Pupils are equal, round, and reactive to light. No scleral icterus.   Neck: Normal range of motion. Neck supple. No JVD present. No thyromegaly present. "   Cardiovascular: Normal rate and regular rhythm.  Exam reveals no gallop and no friction rub.    No murmur heard.  Permanent pacemaker   Pulmonary/Chest: Effort normal and breath sounds normal. He has no wheezes. He has no rales.   Abdominal: Soft. He exhibits no distension and no mass. There is no tenderness.   Musculoskeletal: Normal range of motion. He exhibits edema. He exhibits no deformity.   1+ ankle edema bilaterally   Lymphadenopathy:     He has no cervical adenopathy.   Neurological: He is alert and oriented to person, place, and time. He has normal reflexes. No cranial nerve deficit.   Skin: Skin is warm and dry. No rash noted. No erythema.   Psychiatric: He has a normal mood and affect. His behavior is normal. Judgment normal.         RECENT LABS:  Hematology WBC   Date Value Ref Range Status   06/14/2018 5.68 4.50 - 10.70 10*3/mm3 Final     RBC   Date Value Ref Range Status   06/14/2018 3.63 (L) 4.60 - 6.00 10*6/mm3 Final     Hemoglobin   Date Value Ref Range Status   06/14/2018 10.7 (L) 13.7 - 17.6 g/dL Final     Hematocrit   Date Value Ref Range Status   06/14/2018 32.5 (L) 40.4 - 52.2 % Final     Platelets   Date Value Ref Range Status   06/14/2018 234 140 - 500 10*3/mm3 Final          Lab Results   Component Value Date    GLUCOSE 99 05/19/2018    BUN 33 (H) 05/19/2018    CREATININE 1.09 05/19/2018    EGFRIFNONA 63 05/19/2018    BCR 30.3 (H) 05/19/2018    K 4.3 05/19/2018    CO2 24.9 05/19/2018    CALCIUM 8.6 05/19/2018    ALBUMIN 3.30 (L) 05/19/2018    LABIL2 1.1 05/19/2018    AST 8 05/19/2018    ALT 9 05/19/2018     PATHOLOGY 6/6/2018  Final Diagnosis   LUNG, RIGHT UPPER LOBE, CT-GUIDED NEEDLE BIOPSY:        POSITIVE FOR NON-SMALL CELL CARCINOMA WITH NONSPECIFIC IMMUNOHISTOCHEMICAL STAINING               PROFILE (SEE COMMENT).     COMMENT: A large battery of immunohistochemical stains was performed.  The tumor cells are largely negative for all organ-specific markers tested.  The origin of this  non-small cell carcinoma is indeterminate.  Correlation with clinical and radiologic findings is recommended.  If additional studies are desired, please notify the laboratory.          Assessment/Plan     1.  Non-small cell carcinoma presenting with a right upper lobe lung mass with mediastinal adenopathy.  This was detected on CT scan of the chest from 5/19/2018.  The CT guided biopsy could not differentiate whether this is squamous or adenocarcinoma.  Also the markers were inconclusive as to whether or not this was a primary lung cancer although certainly it has presented as a primary lung cancer.  Molecular studies have not yet been performed on the tissue.  2.  Chronic atrial fibrillation with chronic anticoagulation on Coumadin and permanent pacemaker.    Plan  1.  We discussed the situation at length with the patient and his stepdaughter.  We explained that the biopsy did confirm that this is a malignant process most likely originating in the lung.  The CT scan suggests that this is stage III disease at least.  2.  He continues to be relatively asymptomatic although he does have cough and some occasional pain.  We will prescribe hydrocodone cough syrup and also some hydrocodone tablets for pain.  3.  We have requested molecular testing for PDL 1 expression, EGFR mutation, and ALK mutation.  4.  We will plan to see the patient back in our office in 2 weeks to review the results the molecular studies and discuss any reasonable treatment options.  It was clear from our discussion today that the patient is willing to try treatment if we think it's beneficial but he is more interested in quality of life rather than lengthening his life at this point.

## 2018-06-18 NOTE — PROGRESS NOTES
Fax rec from [facility] pts Hycodan needed a PA. I have submitted the PA to [facility] through covermymeds.    Awaiting decision.

## 2018-06-19 NOTE — PROGRESS NOTES
Staff message response rec from Dr Alva about Hycodan. See below.    Rolando Alva MD sent to [facility]             Yes, thanks!    Previous Messages      ----- Message -----   From: [facility]   Sent: 6/19/2018  12:31 PM   To: Rolando Alva MD   Subject: RE: Hycodan                                       I will try this through the insurance.   Would you like the usual dose of 200 mg TID?     Thank you,   Little   ----- Message -----   From: Rolando Alva MD   Sent: 6/19/2018  11:58 AM   To: [facility]   Subject: RE: Hycodan                                       How about tessalon perles?     ----- Message -----   From: [facility] [place]   Sent: 6/19/2018  11:04 AM   To: Rolando Alva MD   Subject: Hycodan                                           Dr Alva,     Elder's insurance will not approve the Hycodan for him. Perhaps there is something else he can be put on for his cough?     Thank you,   [facility]        I have escribed the rx for Tessalon Perles to [facility].

## 2018-06-19 NOTE — PROGRESS NOTES
Fax rec from [facility] stating the Hycodan PA has been denied due to it being an exclusion from pts Medicare Part D coverage by law. The requested medication is not covered under Part D coverage.    I have sent Dr Alva a message to notify him of the denial and scanned the denial letter into pts chart.

## 2018-06-27 NOTE — PROGRESS NOTES
"I contacted [facility] [phone] to check on the status of the Tessalon Perles. I spoke to Lauren, Pharmacist, and she states pt paid out of pocket for the Hycodan. She states they \"cashed it out\". I inquired about the cost for pt and she states his cost was $28.76. They have put the Tessalon Perles rx in his profile.   "

## 2018-07-06 NOTE — PROGRESS NOTES
[facility] referral sent as ordered.  Orders faxed to office and referral called to [facility].    Daughter in law, Rosaura May to be .  Notified [facility] of need for bed, wheelchair,and palliative oxygen.

## 2018-07-06 NOTE — PROGRESS NOTES
Subjective     REASON FOR FOLLOW UP:  Non-small cell lung carcinoma presenting with a right upper lobe lung mass with mediastinal adenopathy on CT scan of the chest 5/19/2018    HISTORY OF PRESENT ILLNESS:  The patient is a 89 y.o. year old male who is here for an opinion about the above issue.  He had recently been evaluated in the emergency room for chest pain and shortness of breath.  He underwent a CT scan with IV contrast that revealed a 5-1/2 cm mass in the right upper lobe with some mediastinal adenopathy measuring up to 4 cm. H did successfully undergo a CT-guided needle biopsy of the right lung mass which confirmed non-small cell carcinoma most likely from lung origin although the immunohistochemical stains were nonconclusive.  Also, they did not differentiate between squamous and adenocarcinoma.    He returns today with several family members to make a decision regarding whether or not to pursue any treatment or palliative care.  We sent molecular studies and unfortunately he was PDL 1 negative and also did not have any targetable mutations.  We discussed that that really only leaves us with the option of palliative chemotherapy or palliative radiation and I don't think we would be improving his quality of life with these treatments.  The patient and family agreed and we will initiate a hospice referral to focus on comfort care during the terminal phase of his illness.    The patient still seems relatively comfortable but is having some symptoms of occasional chest pain and cough.      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 2014    Right  arm   • H/O BPH (benign prostatic hyperplasia)    • H/O Pulmonary embolism    • H/O Pulmonary granuloma 03/2008    Right ML via CT   • Health care maintenance    • Hyperlipidemia    • Hypertension    • Lung cancer (CMS/HCC) 05/2018    Right   • Macular degeneration    • Nephrolithiasis 2011, 2012   • Secondary hyperparathyroidism (CMS/HCC)    • Skin cancer     Basal cell        Past Surgical History:   Procedure Laterality Date   • CIRCUMCISION  2007   • COLONOSCOPY      x2   • EMBOLECTOMY Right 12/2014    Upper arm, removed clot   • EYE SURGERY Bilateral 01/2007   • HERNIA REPAIR Bilateral     Inguinal   • HYDROCELE EXCISION / REPAIR Bilateral     Repair   • ORCHIECTOMY      Unilatearl 9+ years ago   • PACEMAKER IMPLANTATION  2005        Current Outpatient Prescriptions on File Prior to Visit   Medication Sig Dispense Refill   • amLODIPine (NORVASC) 5 MG tablet Take 1 tablet by mouth Daily. (Patient taking differently: Take 2.5 mg by mouth Daily.) 90 tablet 1   • atorvastatin (LIPITOR) 20 MG tablet Take 1 tablet by mouth daily.     • COUMADIN 4 MG tablet TAKE ONE TABLET BY MOUTH DAILY OR AS DIRECTED. 90 tablet 0   • HYDROcodone-acetaminophen (NORCO) 5-325 MG per tablet Take 1 tablet by mouth Every 6 (Six) Hours As Needed for Moderate Pain . 60 tablet 0   • HYDROcodone-homatropine (HYCODAN) 5-1.5 MG/5ML syrup Take 5 mL by mouth Every 4 (Four) Hours As Needed for Cough. 240 mL 0   • irbesartan (AVAPRO) 300 MG tablet Take 1 tablet by mouth daily.     • nitroglycerin (NITROSTAT) 0.4 MG SL tablet Place 0.4 mg under the tongue every 5 (five) minutes as needed for chest pain. Take no more than 3 doses in 15 minutes.       Current Facility-Administered Medications on File Prior to Visit   Medication Dose Route Frequency Provider Last Rate Last Dose   • nitroglycerin (NITROSTAT) SL tablet 0.4 mg  0.4 mg Sublingual Q5 Min PRN Leon Tanner MD       • sodium chloride 0.9 % flush 10 mL  10 mL Intravenous PRN Leon MARCUM  "MD Ciro            ALLERGIES:  No Known Allergies     Social History     Social History   • Marital status:      Spouse name: Sulema     Occupational History   •  Retired     Social History Main Topics   • Smoking status: Former Smoker   • Smokeless tobacco: Never Used      Comment: quit 1985   • Alcohol use No      Comment: NO CAFFEINE USE   • Drug use: No     Other Topics Concern   • Not on file        Family History   Problem Relation Age of Onset   • No Known Problems Mother    • No Known Problems Father    • Heart disease Other    • Hypertension Other         Review of Systems   Constitutional: Negative for activity change, chills, fatigue and fever.   HENT: Positive for hearing loss. Negative for mouth sores, trouble swallowing and voice change.    Eyes: Positive for visual disturbance. Negative for pain.   Respiratory: Positive for cough. Negative for shortness of breath and wheezing.    Cardiovascular: Positive for leg swelling. Negative for chest pain and palpitations.   Gastrointestinal: Negative for abdominal pain, constipation, diarrhea, nausea and vomiting.   Endocrine: Positive for cold intolerance.   Genitourinary: Positive for frequency. Negative for difficulty urinating and urgency.   Musculoskeletal: Positive for gait problem. Negative for arthralgias and joint swelling.   Skin: Negative for rash.   Neurological: Positive for weakness. Negative for dizziness, seizures and headaches.   Hematological: Negative for adenopathy. Does not bruise/bleed easily.   Psychiatric/Behavioral: Negative for behavioral problems and confusion. The patient is not nervous/anxious.         Objective     Vitals:    07/06/18 1050   BP: 149/70   Pulse: 74   Resp: 16   Temp: 98.4 °F (36.9 °C)   TempSrc: Oral   SpO2: 91%   Weight: 75.8 kg (167 lb 1.6 oz)   Height: 172 cm (67.72\")   PainSc: 0-No pain     Current Status 7/6/2018   ECOG score 1       Physical Exam   Constitutional: He is oriented to person, place, and " time. He appears well-developed and well-nourished. No distress.   HENT:   Head: Normocephalic.   Eyes: Conjunctivae and EOM are normal. Pupils are equal, round, and reactive to light. No scleral icterus.   Neck: Normal range of motion. Neck supple. No JVD present. No thyromegaly present.   Cardiovascular: Normal rate and regular rhythm.  Exam reveals no gallop and no friction rub.    No murmur heard.  Permanent pacemaker   Pulmonary/Chest: Effort normal and breath sounds normal. He has no wheezes. He has no rales.   Abdominal: Soft. He exhibits no distension and no mass. There is no tenderness.   Musculoskeletal: Normal range of motion. He exhibits edema. He exhibits no deformity.   1+ ankle edema bilaterally   Lymphadenopathy:     He has no cervical adenopathy.   Neurological: He is alert and oriented to person, place, and time. He has normal reflexes. No cranial nerve deficit.   Skin: Skin is warm and dry. No rash noted. No erythema.   Psychiatric: He has a normal mood and affect. His behavior is normal. Judgment normal.         RECENT LABS:  Hematology WBC   Date Value Ref Range Status   07/06/2018 6.51 4.50 - 10.70 10*3/mm3 Final     RBC   Date Value Ref Range Status   07/06/2018 3.82 (L) 4.60 - 6.00 10*6/mm3 Final     Hemoglobin   Date Value Ref Range Status   07/06/2018 11.1 (L) 13.7 - 17.6 g/dL Final     Hematocrit   Date Value Ref Range Status   07/06/2018 34.1 (L) 40.4 - 52.2 % Final     Platelets   Date Value Ref Range Status   07/06/2018 242 140 - 500 10*3/mm3 Final          Lab Results   Component Value Date    GLUCOSE 99 05/19/2018    BUN 33 (H) 05/19/2018    CREATININE 1.09 05/19/2018    EGFRIFNONA 63 05/19/2018    BCR 30.3 (H) 05/19/2018    K 4.3 05/19/2018    CO2 24.9 05/19/2018    CALCIUM 8.6 05/19/2018    ALBUMIN 3.30 (L) 05/19/2018    LABIL2 1.1 05/19/2018    AST 8 05/19/2018    ALT 9 05/19/2018     PATHOLOGY 6/6/2018  Final Diagnosis   LUNG, RIGHT UPPER LOBE, CT-GUIDED NEEDLE BIOPSY:         POSITIVE FOR NON-SMALL CELL CARCINOMA WITH NONSPECIFIC IMMUNOHISTOCHEMICAL STAINING               PROFILE (SEE COMMENT).     COMMENT: A large battery of immunohistochemical stains was performed.  The tumor cells are largely negative for all organ-specific markers tested.  The origin of this non-small cell carcinoma is indeterminate.  Correlation with clinical and radiologic findings is recommended.  If additional studies are desired, please notify the laboratory.          Assessment/Plan     1.  Non-small cell carcinoma presenting with a right upper lobe lung mass with mediastinal adenopathy.  This was detected on CT scan of the chest from 5/19/2018.  The CT guided biopsy could not differentiate whether this is squamous or adenocarcinoma.  Also the markers were inconclusive as to whether or not this was a primary lung cancer although certainly it has presented as a primary lung cancer.  Molecular studies Were not helpful as he did not have any targetable mutations and PD L1 was negative.  2.  Chronic atrial fibrillation with chronic anticoagulation on Coumadin and permanent pacemaker.    Plan  1.  We had a lengthy discussion with the patient and multiple family members today in the office.  He is mainly interested in quality of life and he and his family were agreeable to the idea of hospice support at home.  2.  We will initiate a hospice referral from the office today as soon as possible and will request delivery of some equipment including a hospital bed, palliative oxygen and a wheelchair.  3.  We have not scheduled a routine follow-up in our office but certainly we'll will remain available if we can help out in any way possible.

## 2018-07-18 NOTE — TELEPHONE ENCOUNTER
Reviewed with Dr Alva, left a message for Saundra with [facility] to stop Avapro due to  Low b/p.

## 2018-07-18 NOTE — TELEPHONE ENCOUNTER
----- Message from Mayela Zazueta sent at 7/18/2018  2:32 PM EDT -----  Regarding: hospice  Contact: [phone]  Saundra with hospice is calling because pt's blood pressure is 90/56